# Patient Record
Sex: MALE | Race: WHITE | NOT HISPANIC OR LATINO
[De-identification: names, ages, dates, MRNs, and addresses within clinical notes are randomized per-mention and may not be internally consistent; named-entity substitution may affect disease eponyms.]

---

## 2017-01-26 PROBLEM — Z00.00 ENCOUNTER FOR PREVENTIVE HEALTH EXAMINATION: Status: ACTIVE | Noted: 2017-01-26

## 2017-02-02 ENCOUNTER — APPOINTMENT (OUTPATIENT)
Dept: CARDIOLOGY | Facility: CLINIC | Age: 67
End: 2017-02-02

## 2017-04-04 ENCOUNTER — APPOINTMENT (OUTPATIENT)
Dept: CARDIOLOGY | Facility: CLINIC | Age: 67
End: 2017-04-04

## 2017-04-11 ENCOUNTER — APPOINTMENT (OUTPATIENT)
Dept: CARDIOLOGY | Facility: CLINIC | Age: 67
End: 2017-04-11

## 2017-04-13 ENCOUNTER — APPOINTMENT (OUTPATIENT)
Dept: CARDIOLOGY | Facility: CLINIC | Age: 67
End: 2017-04-13

## 2017-10-02 ENCOUNTER — TRANSCRIPTION ENCOUNTER (OUTPATIENT)
Age: 67
End: 2017-10-02

## 2018-04-03 ENCOUNTER — RECORD ABSTRACTING (OUTPATIENT)
Age: 68
End: 2018-04-03

## 2018-04-04 ENCOUNTER — APPOINTMENT (OUTPATIENT)
Dept: CARDIOLOGY | Facility: CLINIC | Age: 68
End: 2018-04-04
Payer: MEDICARE

## 2018-04-04 VITALS
SYSTOLIC BLOOD PRESSURE: 118 MMHG | WEIGHT: 222 LBS | HEIGHT: 72 IN | DIASTOLIC BLOOD PRESSURE: 72 MMHG | HEART RATE: 56 BPM | BODY MASS INDEX: 30.07 KG/M2

## 2018-04-04 PROCEDURE — 99214 OFFICE O/P EST MOD 30 MIN: CPT

## 2018-06-05 ENCOUNTER — APPOINTMENT (OUTPATIENT)
Dept: CARDIOLOGY | Facility: CLINIC | Age: 68
End: 2018-06-05
Payer: MEDICARE

## 2018-06-05 PROCEDURE — 93306 TTE W/DOPPLER COMPLETE: CPT

## 2018-06-07 ENCOUNTER — RESULT REVIEW (OUTPATIENT)
Age: 68
End: 2018-06-07

## 2018-06-07 ENCOUNTER — APPOINTMENT (OUTPATIENT)
Dept: CARDIOLOGY | Facility: CLINIC | Age: 68
End: 2018-06-07

## 2018-06-20 ENCOUNTER — APPOINTMENT (OUTPATIENT)
Dept: CARDIOLOGY | Facility: CLINIC | Age: 68
End: 2018-06-20
Payer: MEDICARE

## 2018-06-20 PROCEDURE — 78472 GATED HEART PLANAR SINGLE: CPT

## 2018-06-20 PROCEDURE — A9560: CPT

## 2018-08-01 ENCOUNTER — APPOINTMENT (OUTPATIENT)
Dept: CARDIOLOGY | Facility: CLINIC | Age: 68
End: 2018-08-01
Payer: MEDICARE

## 2018-08-01 VITALS
BODY MASS INDEX: 30.34 KG/M2 | HEART RATE: 56 BPM | WEIGHT: 224 LBS | SYSTOLIC BLOOD PRESSURE: 138 MMHG | HEIGHT: 72 IN | DIASTOLIC BLOOD PRESSURE: 70 MMHG

## 2018-08-01 PROCEDURE — 99214 OFFICE O/P EST MOD 30 MIN: CPT

## 2018-11-07 ENCOUNTER — APPOINTMENT (OUTPATIENT)
Dept: CARDIOLOGY | Facility: CLINIC | Age: 68
End: 2018-11-07
Payer: MEDICARE

## 2018-11-07 ENCOUNTER — NON-APPOINTMENT (OUTPATIENT)
Age: 68
End: 2018-11-07

## 2018-11-07 VITALS
BODY MASS INDEX: 30.48 KG/M2 | HEART RATE: 64 BPM | SYSTOLIC BLOOD PRESSURE: 138 MMHG | DIASTOLIC BLOOD PRESSURE: 78 MMHG | HEIGHT: 72 IN | OXYGEN SATURATION: 98 % | WEIGHT: 225 LBS

## 2018-11-07 PROCEDURE — 99214 OFFICE O/P EST MOD 30 MIN: CPT

## 2018-11-07 PROCEDURE — 93000 ELECTROCARDIOGRAM COMPLETE: CPT

## 2018-11-07 NOTE — DISCUSSION/SUMMARY
[FreeTextEntry1] : #1 Labs from August was reviewed\par #2 MUGA study was reviewed. Low normal to slightly reduced LV systolic function. Clinically stable without signs of congestive heart failure. Chronic bundle-branch block.\par Nonobstructive coronary disease in the past. No signs of unstable CAD.\par  Cozaar  to 50 mg. \par Considering his tolerating losartan 50 mg we will increase metoprolol 25 mg to 50 mg long acting. He'll have a repeat LV ejection fraction in 3 months. If remains less than 50% in presence of left bundle branch block. I would like him to be seen by electrophysiologist to discuss indication for biventricular pacing.\par Low salt diet. low  alcohol intake.\par Regular exercise.\par weight reduction.\par If change in clinical status or worsening of his systolic function in the future. We can consider further evaluation, which includes a repeat coronary angiography and biventricular device therapy\par #3 hyperlipidemia. Continue statin therapy. Low saturated fat, carbohydrate intake. Regular exercise program. Weight reduction. Follow labs.\par #4 essential hypertension. Without any signs of congestive heart failure or renal insufficiency. No salt diet recommended. Regular exercise and weight reduction.\par #5 preventive care with your office.\par I have reviewed above at length. I answered all the questions. Patient verbalized understanding\par Thank you very much for allowing me to participate in your patient's care. Please feel free to call me for any questions.\par \par Followup in 3 months.

## 2018-11-07 NOTE — REASON FOR VISIT
[Follow-Up - Clinic] : a clinic follow-up of [Abnormal ECG] : an abnormal ECG [Cardiomyopathy] : cardiomyopathy [Hyperlipidemia] : hyperlipidemia [Hypertension] : hypertension [Medication Management] : Medication management [FreeTextEntry1] : 68-year-old comes in for a followup consultation \par No new changes in his symptoms overall stable without any significant chest pain.\par No palpitation, dizziness, or syncopal episode.\par Exertional dyspnea. He is stable. Mild severity. Intermittent in nature. Modified with exertion. none at rest. Nonprogressive.\par Stable. Diet.\par No recent changes in medications.\par No recent hospital admission. [Spouse] : spouse

## 2018-11-07 NOTE — ASSESSMENT
[FreeTextEntry1] : past tests for reference\par Echocardiogram which was done in January 9, 2017 at [facility] showed an ejection fraction 47%. Mildly reduced global LV dysfunction. Aortic root of 3.7 cm. Ascending aorta 3.48 cm. Septal motion abnormality related to conduction system abnormality. aortic valve sclerosis, trace aortic insufficnecy\par \par Pharmacological stress myocardial perfusion scan, 4/11/ 2017. LV ejection fraction 54% with paradoxical septal motion. Small area of mild defect in septal wall, which was fixed via probably related to left bundle-branch block. Overall, no significant worsening compared to last perfusion scan from 2007. Coronary angiogram at that time had shown no significant coronary artery disease\par \par Reviewed on August 1, 2018.\par MUGA study June 20, 2018  and ejection fraction 49%. Septal hypokinesis.

## 2018-11-07 NOTE — HISTORY OF PRESENT ILLNESS
[FreeTextEntry1] : History of essential hypertension. Much better controlled. The only on metoprolol. No history of CHF. No history of CKD. His nonsmoker.\par Hyperlipidemia. Not on any statin therapy. Diet controlled.\par Left bundle branch block. Chronic. Prior cardiac evaluation had shown an ejection fraction 55% in 2013. subsequently noted between 47-54% \par coronary angiogram 2008: mild irregularity\par mild- moderate aortic and mitral regurgitation. \par Mild carotid atherosclerosis \par

## 2018-11-07 NOTE — PHYSICAL EXAM
[General Appearance - Well Developed] : well developed [Normal Appearance] : normal appearance [Well Groomed] : well groomed [General Appearance - Well Nourished] : well nourished [No Deformities] : no deformities [General Appearance - In No Acute Distress] : no acute distress [Normal Conjunctiva] : the conjunctiva exhibited no abnormalities [Eyelids - No Xanthelasma] : the eyelids demonstrated no xanthelasmas [No Oral Pallor] : no oral pallor [Normal Jugular Venous A Waves Present] : normal jugular venous A waves present [Normal Jugular Venous V Waves Present] : normal jugular venous V waves present [No Jugular Venous Gonzalez A Waves] : no jugular venous gonzalez A waves [] : no respiratory distress [Respiration, Rhythm And Depth] : normal respiratory rhythm and effort [Exaggerated Use Of Accessory Muscles For Inspiration] : no accessory muscle use [Auscultation Breath Sounds / Voice Sounds] : lungs were clear to auscultation bilaterally [Heart Rate And Rhythm] : heart rate and rhythm were normal [Heart Sounds] : normal S1 and S2 [Arterial Pulses Normal] : the arterial pulses were normal [Edema] : no peripheral edema present [Veins - Varicosity Changes] : no varicosital changes were noted in the lower extremities [Abnormal Walk] : normal gait [Nail Clubbing] : no clubbing of the fingernails [Cyanosis, Localized] : no localized cyanosis [Skin Color & Pigmentation] : normal skin color and pigmentation [Oriented To Time, Place, And Person] : oriented to person, place, and time [Affect] : the affect was normal [Mood] : the mood was normal [No Anxiety] : not feeling anxious

## 2019-02-11 ENCOUNTER — APPOINTMENT (OUTPATIENT)
Dept: CARDIOLOGY | Facility: CLINIC | Age: 69
End: 2019-02-11
Payer: MEDICARE

## 2019-02-11 PROCEDURE — 93308 TTE F-UP OR LMTD: CPT

## 2019-02-18 ENCOUNTER — RECORD ABSTRACTING (OUTPATIENT)
Age: 69
End: 2019-02-18

## 2019-02-19 ENCOUNTER — APPOINTMENT (OUTPATIENT)
Dept: CARDIOLOGY | Facility: CLINIC | Age: 69
End: 2019-02-19
Payer: MEDICARE

## 2019-02-19 VITALS
OXYGEN SATURATION: 100 % | SYSTOLIC BLOOD PRESSURE: 130 MMHG | DIASTOLIC BLOOD PRESSURE: 72 MMHG | HEIGHT: 72 IN | WEIGHT: 226 LBS | HEART RATE: 54 BPM | BODY MASS INDEX: 30.61 KG/M2

## 2019-02-19 DIAGNOSIS — Z87.898 PERSONAL HISTORY OF OTHER SPECIFIED CONDITIONS: ICD-10-CM

## 2019-02-19 PROCEDURE — 99214 OFFICE O/P EST MOD 30 MIN: CPT

## 2019-02-19 RX ORDER — CEPHALEXIN 500 MG/1
500 CAPSULE ORAL
Qty: 35 | Refills: 0 | Status: DISCONTINUED | COMMUNITY
Start: 2018-07-10 | End: 2019-02-19

## 2019-02-19 RX ORDER — FLUOCINONIDE 0.5 MG/ML
0.05 SOLUTION TOPICAL
Qty: 60 | Refills: 0 | Status: DISCONTINUED | COMMUNITY
Start: 2018-06-12 | End: 2019-02-19

## 2019-02-19 NOTE — ASSESSMENT
[FreeTextEntry1] : past tests for reference\par Echocardiogram which was done in January 9, 2017 at [facility] showed an ejection fraction 47%. Mildly reduced global LV dysfunction. Aortic root of 3.7 cm. Ascending aorta 3.48 cm. Septal motion abnormality related to conduction system abnormality. aortic valve sclerosis, trace aortic insufficnecy\par \par Pharmacological stress myocardial perfusion scan, 4/11/ 2017. LV ejection fraction 54% with paradoxical septal motion. Small area of mild defect in septal wall, which was fixed via probably related to left bundle-branch block. Overall, no significant worsening compared to last perfusion scan from 2007. Coronary angiogram at that time had shown no significant coronary artery disease\par \par Reviewed on August 1, 2018.\par MUGA study June 20, 2018  and ejection fraction 49%. Septal hypokinesis.\par \par Reviewed on February 19, 2019\par Echocardiogram February 11, 2019 LV ejection fraction around 45-50%  LVIDd 5.1

## 2019-02-19 NOTE — DISCUSSION/SUMMARY
[FreeTextEntry1] : #1Echocardiogram reviewed.  stable. Findings, discussed . we will continue to follow. Any change in function status, which is class I or change in LV dimensions or an ejection fraction in future. We can discuss further whether he would benefit from biventricular device in presence of baseline left bundle branch block\par #2 MUGA study was reviewed. Low normal to slightly reduced LV systolic function. Clinically stable without signs of congestive heart failure. Chronic bundle-branch block.\par Nonobstructive coronary disease in the past. No signs of unstable CAD.\par  Continue present medications\par Low salt diet. low  alcohol intake.\par Regular exercise.\par weight reduction.\par If change in clinical status or worsening of his systolic function in the future. We can consider further evaluation, which includes a repeat coronary angiography and biventricular device therapy\par #3 hyperlipidemia. Continue statin therapy. Low saturated fat, carbohydrate intake. Regular exercise program. Weight reduction. Follow labs.\par #4 essential hypertension. Without any signs of congestive heart failure or renal insufficiency. No salt diet recommended. Regular exercise and weight reduction.\par #5 preventive care with your office.\par I have reviewed above at length. I answered all the questions. Patient verbalized understanding\par Thank you very much for allowing me to participate in your patient's care. Please feel free to call me for any questions.\par \par Followup in 6 months.

## 2019-02-19 NOTE — REASON FOR VISIT
7 [Follow-Up - Clinic] : a clinic follow-up of [Abnormal ECG] : an abnormal ECG [Cardiomyopathy] : cardiomyopathy [Hyperlipidemia] : hyperlipidemia [Hypertension] : hypertension [Medication Management] : Medication management [FreeTextEntry1] : 69-year-old comes in for followup consultation review his echocardiogram\par No new changes in his symptoms overall stable without any significant chest pain.\par No palpitation, dizziness, or syncopal episode.\par Exertional dyspnea. He is stable. Mild severity. Intermittent in nature. Modified with exertion. none at rest. Nonprogressive.\par Stable. Diet.\par No recent changes in medications.\par No recent hospital admission. [Spouse] : spouse

## 2019-04-03 ENCOUNTER — APPOINTMENT (OUTPATIENT)
Dept: CARDIOLOGY | Facility: CLINIC | Age: 69
End: 2019-04-03

## 2019-04-11 ENCOUNTER — TRANSCRIPTION ENCOUNTER (OUTPATIENT)
Age: 69
End: 2019-04-11

## 2019-09-04 ENCOUNTER — APPOINTMENT (OUTPATIENT)
Dept: CARDIOLOGY | Facility: CLINIC | Age: 69
End: 2019-09-04
Payer: MEDICARE

## 2019-09-04 VITALS
SYSTOLIC BLOOD PRESSURE: 124 MMHG | HEART RATE: 59 BPM | DIASTOLIC BLOOD PRESSURE: 68 MMHG | WEIGHT: 220 LBS | BODY MASS INDEX: 29.8 KG/M2 | HEIGHT: 72 IN | OXYGEN SATURATION: 96 %

## 2019-09-04 DIAGNOSIS — I49.9 CARDIAC ARRHYTHMIA, UNSPECIFIED: ICD-10-CM

## 2019-09-04 PROCEDURE — 99214 OFFICE O/P EST MOD 30 MIN: CPT

## 2019-09-04 RX ORDER — ROSUVASTATIN CALCIUM 20 MG/1
20 TABLET, FILM COATED ORAL DAILY
Refills: 0 | Status: DISCONTINUED | COMMUNITY
End: 2019-09-04

## 2019-09-04 RX ORDER — BETAMETHASONE DIPROPIONATE 0.5 MG/G
0.05 OINTMENT TOPICAL
Qty: 45 | Refills: 0 | Status: DISCONTINUED | COMMUNITY
Start: 2018-05-15 | End: 2019-09-04

## 2019-09-04 RX ORDER — LOSARTAN POTASSIUM 50 MG/1
50 TABLET, FILM COATED ORAL DAILY
Qty: 90 | Refills: 1 | Status: DISCONTINUED | COMMUNITY
End: 2019-09-04

## 2019-09-04 RX ORDER — TRIAMCINOLONE ACETONIDE 1 MG/G
0.1 CREAM TOPICAL
Qty: 454 | Refills: 0 | Status: DISCONTINUED | COMMUNITY
Start: 2018-06-12 | End: 2019-09-04

## 2019-09-04 NOTE — ASSESSMENT
[FreeTextEntry1] : past tests for reference\par Echocardiogram which was done in January 9, 2017 at [facility] showed an ejection fraction 47%. Mildly reduced global LV dysfunction. Aortic root of 3.7 cm. Ascending aorta 3.48 cm. Septal motion abnormality related to conduction system abnormality. aortic valve sclerosis, trace aortic insufficnecy\par \par Pharmacological stress myocardial perfusion scan, 4/11/ 2017. LV ejection fraction 54% with paradoxical septal motion. Small area of mild defect in septal wall, which was fixed via probably related to left bundle-branch block. Overall, no significant worsening compared to last perfusion scan from 2007. Coronary angiogram at that time had shown no significant coronary artery disease\par \par Reviewed on August 1, 2018.\par MUGA study June 20, 2018  and ejection fraction 49%. Septal hypokinesis.\par \par Reviewed on February 19, 2019\par Echocardiogram February 11, 2019 LV ejection fraction around 45-50%  LVIDd 5.1\par \par Reviewed on September 4, 2019\par Labs from August 17, 2019 was reviewed. Sodium was 140, potassium 4.4, creatinine 1.25 ., normal.

## 2019-09-04 NOTE — PHYSICAL EXAM
[General Appearance - Well Developed] : well developed [Normal Appearance] : normal appearance [Well Groomed] : well groomed [No Deformities] : no deformities [General Appearance - Well Nourished] : well nourished [General Appearance - In No Acute Distress] : no acute distress [Normal Conjunctiva] : the conjunctiva exhibited no abnormalities [Eyelids - No Xanthelasma] : the eyelids demonstrated no xanthelasmas [No Oral Pallor] : no oral pallor [Normal Jugular Venous A Waves Present] : normal jugular venous A waves present [Normal Jugular Venous V Waves Present] : normal jugular venous V waves present [No Jugular Venous Gonzalez A Waves] : no jugular venous gonzalez A waves [] : no respiratory distress [Respiration, Rhythm And Depth] : normal respiratory rhythm and effort [Exaggerated Use Of Accessory Muscles For Inspiration] : no accessory muscle use [Auscultation Breath Sounds / Voice Sounds] : lungs were clear to auscultation bilaterally [Heart Rate And Rhythm] : heart rate and rhythm were normal [Heart Sounds] : normal S1 and S2 [Arterial Pulses Normal] : the arterial pulses were normal [Edema] : no peripheral edema present [Veins - Varicosity Changes] : no varicosital changes were noted in the lower extremities [Abnormal Walk] : normal gait [Nail Clubbing] : no clubbing of the fingernails [Cyanosis, Localized] : no localized cyanosis [Skin Color & Pigmentation] : normal skin color and pigmentation [Oriented To Time, Place, And Person] : oriented to person, place, and time [Affect] : the affect was normal [Mood] : the mood was normal [No Anxiety] : not feeling anxious

## 2019-09-04 NOTE — ASSESSMENT
[FreeTextEntry1] : past tests for reference\par Echocardiogram which was done in January 9, 2017 at [facility] showed an ejection fraction 47%. Mildly reduced global LV dysfunction. Aortic root of 3.7 cm. Ascending aorta 3.48 cm. Septal motion abnormality related to conduction system abnormality. aortic valve sclerosis, trace aortic insufficnecy\par \par Pharmacological stress myocardial perfusion scan, 4/11/ 2017. LV ejection fraction 54% with paradoxical septal motion. Small area of mild defect in septal wall, which was fixed via probably related to left bundle-branch block. Overall, no significant worsening compared to last perfusion scan from 2007. Coronary angiogram at that time had shown no significant coronary artery disease\par \par Reviewed on August 1, 2018.\par MUGA study June 20, 2018  and ejection fraction 49%. Septal hypokinesis.\par \par Reviewed on February 19, 2019\par Echocardiogram February 11, 2019 LV ejection fraction around 45-50%  LVIDd 5.1\par \par Reviewed on September 4, 2019\par Labs from August 17, 2019 was reviewed. Sodium was 140, potassium 4.4, creatinine 1.25 ., normal.

## 2019-09-04 NOTE — REASON FOR VISIT
[Follow-Up - Clinic] : a clinic follow-up of [Abnormal ECG] : an abnormal ECG [Cardiomyopathy] : cardiomyopathy [Hyperlipidemia] : hyperlipidemia [Hypertension] : hypertension [Medication Management] : Medication management [FreeTextEntry1] : 69-year-old is seen in followup consultation. Since last seen he stopped taking his losartan because of skin rash.\par He has no significant new complaint of chest pain. No significant shortness of breath. No significant PND, orthopnea, pedal edema.\par He is no significant visual disturbances or focal weakness,\par He has been very active.\par He has lost weight.\par He has no claudication pain.\par He has no recent hospitalization. [Spouse] : spouse

## 2019-09-04 NOTE — DISCUSSION/SUMMARY
[FreeTextEntry1] : #1Labs were reviewed. Elevated creatinine. Increase fluid intake recommended. Followup labs again in about 2-3 months.\par #2 History of nonischemic myopathy, and presence of left bundle branch block. Last MUGA showed ejection fraction 49%. He has stopped taking those are done because of a rash. His blood pressure and heart rate is stable. Clinically, no signs of any significant congestive heart failure. Would recommend him to have repeat MUGA again. If LV ejection fraction is decreasing and presence of left bundle branch block. He would benefit from hydralazine/isosorbide mononitrate and biventricular pacing.\par He will continue with management as discussed\par Low salt diet. low  alcohol intake.\par Regular exercise.\par weight reduction.\par If change in clinical status or worsening of his systolic function in the future. We can consider further evaluation, which includes a repeat coronary angiography and biventricular device therapy\par #3 hyperlipidemia. Continue statin therapy. Low saturated fat, carbohydrate intake. Regular exercise program. Weight reduction. Follow labs.\par #4 essential hypertension. Without any signs of congestive heart failure or renal insufficiency. No salt diet recommended. Regular exercise and weight reduction.\par #5 His wife states he has possible apnea at night time. Considering his cardiomyopathy. Essential hypertension. And weakness apneic episode. He didn't have sleep studies done.\par preventive care with your office.\par \par Counseling regarding low saturated fat, salt and carbohydrate intake was reviewed. Active lifestyle and regular. Exercise along with weight management is advised.\par \par I have reviewed above at length. I answered all the questions. Patient verbalized understanding\par Thank you very much for allowing me to participate in your patient's care. Please feel free to call me for any questions.\par \par

## 2019-09-11 ENCOUNTER — APPOINTMENT (OUTPATIENT)
Dept: CARDIOLOGY | Facility: CLINIC | Age: 69
End: 2019-09-11
Payer: MEDICARE

## 2019-09-11 PROCEDURE — A9560: CPT

## 2019-09-11 PROCEDURE — 78472 GATED HEART PLANAR SINGLE: CPT

## 2019-09-16 ENCOUNTER — APPOINTMENT (OUTPATIENT)
Dept: CARDIOLOGY | Facility: CLINIC | Age: 69
End: 2019-09-16
Payer: MEDICARE

## 2019-09-16 VITALS
DIASTOLIC BLOOD PRESSURE: 68 MMHG | HEART RATE: 56 BPM | BODY MASS INDEX: 29.66 KG/M2 | HEIGHT: 72 IN | WEIGHT: 219 LBS | OXYGEN SATURATION: 97 % | SYSTOLIC BLOOD PRESSURE: 124 MMHG

## 2019-09-16 PROCEDURE — 99214 OFFICE O/P EST MOD 30 MIN: CPT

## 2019-09-16 NOTE — HISTORY OF PRESENT ILLNESS
[FreeTextEntry1] : KENROY TRISTAN  is a 69 year M  who presents today Sep 16, 2019 in clinical follow-up. Last seen on 9/4/19 with Dr. Galvez. At that time MUGA scan was recommended. It has been performed and he presents today to review the results. Since last seen no recent illness of hospital stay. Off Losartan due to rash. \par \par Today he denies chest pain, pressure, unusual shortness of breath, lightheadedness, dizziness, near syncope or syncope. \par \par History of essential hypertension. Much better controlled. On metoprolol. No history of CHF. No history of CKD. His nonsmoker.\par Hyperlipidemia. Not on any statin therapy. Diet controlled.\par Left bundle branch block. Chronic. Prior cardiac evaluation had shown an ejection fraction 55% in 2013. subsequently noted between 47-54% \par coronary angiogram 2008: mild irregularity\par mild- moderate aortic and mitral regurgitation. \par Mild carotid atherosclerosis \par

## 2019-09-16 NOTE — ASSESSMENT
[FreeTextEntry1] : past tests for reference\par Echocardiogram which was done in January 9, 2017 at [facility] showed an ejection fraction 47%. Mildly reduced global LV dysfunction. Aortic root of 3.7 cm. Ascending aorta 3.48 cm. Septal motion abnormality related to conduction system abnormality. aortic valve sclerosis, trace aortic insufficnecy\par \par Pharmacological stress myocardial perfusion scan, 4/11/ 2017. LV ejection fraction 54% with paradoxical septal motion. Small area of mild defect in septal wall, which was fixed via probably related to left bundle-branch block. Overall, no significant worsening compared to last perfusion scan from 2007. Coronary angiogram at that time had shown no significant coronary artery disease\par \par Reviewed on August 1, 2018.\par MUGA study June 20, 2018  and ejection fraction 49%. Septal hypokinesis.\par \par Reviewed on February 19, 2019\par Echocardiogram February 11, 2019 LV ejection fraction around 45-50%  LVIDd 5.1\par \par Reviewed on September 4, 2019\par Labs from August 17, 2019 was reviewed. Sodium was 140, potassium 4.4, creatinine 1.25 ., normal.\par \par MUGA 9/11/19 EF 50%

## 2019-09-16 NOTE — DISCUSSION/SUMMARY
[FreeTextEntry1] : KENROY TRISTAN  is a 69 year M  who presents today Sep 16, 2019 with the above history and the following active issues. \par \par History of nonischemic cardiomyopathy, and presence of left bundle branch block. MUGA scan demonstrates EF 50%. He has stopped taking Losartan because of a rash. His blood pressure and heart rate is stable. Clinically, no signs of any significant congestive heart failure. Recommend trial of Lisinopril 10mg.\par He will continue with management as discussed\par Low salt diet. low  alcohol intake.\par Regular exercise.\par weight reduction.\par If change in clinical status or worsening of his systolic function in the future. We can consider further evaluation, which includes a repeat coronary angiography and biventricular device therapy\par \par Hyperlipidemia. Continue statin therapy. Low saturated fat, carbohydrate intake. Regular exercise program. Weight reduction. Follow labs.\par \par Essential hypertension. Without any signs of congestive heart failure or renal insufficiency. Low salt diet recommended. Regular exercise and weight reduction.\par \par Red flag symptoms which would warrant sooner emergent evaluation reviewed with the patient. \par Questions and concerns were addressed and answered. \par \par Sincerely,\par \par Bhavani Meyer PA-C\par Patients history, testing and plan reviewed with supervising MD: Dr. Jim Galvez

## 2019-12-30 ENCOUNTER — APPOINTMENT (OUTPATIENT)
Dept: CARDIOLOGY | Facility: CLINIC | Age: 69
End: 2019-12-30
Payer: MEDICARE

## 2019-12-30 VITALS
BODY MASS INDEX: 29.8 KG/M2 | HEART RATE: 58 BPM | OXYGEN SATURATION: 100 % | DIASTOLIC BLOOD PRESSURE: 70 MMHG | SYSTOLIC BLOOD PRESSURE: 110 MMHG | WEIGHT: 220 LBS | HEIGHT: 72 IN

## 2019-12-30 PROCEDURE — 99214 OFFICE O/P EST MOD 30 MIN: CPT

## 2019-12-30 NOTE — ASSESSMENT
[FreeTextEntry1] : past tests for reference\par Echocardiogram which was done in January 9, 2017 at [facility] showed an ejection fraction 47%. Mildly reduced global LV dysfunction. Aortic root of 3.7 cm. Ascending aorta 3.48 cm. Septal motion abnormality related to conduction system abnormality. aortic valve sclerosis, trace aortic insufficnecy\par \par Pharmacological stress myocardial perfusion scan, 4/11/ 2017. LV ejection fraction 54% with paradoxical septal motion. Small area of mild defect in septal wall, which was fixed via probably related to left bundle-branch block. Overall, no significant worsening compared to last perfusion scan from 2007. Coronary angiogram at that time had shown no significant coronary artery disease\par \par Reviewed on August 1, 2018.\par MUGA study June 20, 2018  and ejection fraction 49%. Septal hypokinesis.\par \par Reviewed on February 19, 2019\par Echocardiogram February 11, 2019 LV ejection fraction around 45-50%  LVIDd 5.1\par \par Reviewed on September 4, 2019\par Labs from August 17, 2019 was reviewed. Sodium was 140, potassium 4.4, creatinine 1.25 ., normal.\par \par Review December 30, 2019\par Labs from November 20 6019 in December 20, 2019 was reviewed.\par MUGA 9/11/19 LVEF 50%

## 2019-12-30 NOTE — DISCUSSION/SUMMARY
[FreeTextEntry1] : #1Labs were reviewed. Slightly lower, GFR  stable. Follow with primary care physician. Continue ACE inhibitor. If needed consider nephrology evaluation.\par #2 History of nonischemic myopathy, and presence of left bundle branch block. Last MUGA showed ejection fraction 50%.  His blood pressure and heart rate is stable. Clinically, no signs of any significant congestive heart failure. Would recommend him to have repeat MUGA again.He will continue with lisinopril/metoprolol. If a reduction in LV systolic function is noted in future he may benefit from biventricular pacing in presence of chronic left bundle branch block\par Low salt diet. low  alcohol intake.\par Regular exercise.\par weight reduction.\par #3 hyperlipidemia. Continue statin therapy. Low saturated fat, carbohydrate intake. Regular exercise program. Weight reduction. Follow labs.\par #4 essential hypertension. Without any signs of congestive heart failure or renal insufficiency. No salt diet recommended. Regular exercise and weight reduction.\par #5 Sleep apnea. Continue use of CPAP.\par preventive care with your office.\par \par Counseling regarding low saturated fat, salt and carbohydrate intake was reviewed. Active lifestyle and regular. Exercise along with weight management is advised.\par \par All the above were at length reviewed. Answered all the questions. Thank you very much for this kind referral. Please do not hesitate to give me a call for any question.\par Part of this transcription was done with voice recognition software and phonetically similar errors are common. I apologize for that. Please do not hesitate to call for any questions due to above.\par \par \par

## 2019-12-30 NOTE — HISTORY OF PRESENT ILLNESS
[FreeTextEntry1] : History of essential hypertension. Much better controlled. The only on metoprolol. No history of CHF. No history of CKD. His nonsmoker.\par Hyperlipidemia. Not on any statin therapy. Diet controlled.\par Left bundle branch block. Chronic. \par coronary angiogram 2008: mild irregularity\par Nonischemic cardiomyopathy. Mildly reduced systolic function. September 2019 MUGA 50%. echo 2/19 45-50% LVIDd 5.1 cm\par mild- moderate aortic and mitral regurgitation. \par Mild carotid atherosclerosis \par

## 2019-12-30 NOTE — PHYSICAL EXAM
[General Appearance - Well Developed] : well developed [Well Groomed] : well groomed [Normal Appearance] : normal appearance [General Appearance - Well Nourished] : well nourished [No Deformities] : no deformities [Normal Conjunctiva] : the conjunctiva exhibited no abnormalities [General Appearance - In No Acute Distress] : no acute distress [Eyelids - No Xanthelasma] : the eyelids demonstrated no xanthelasmas [No Oral Pallor] : no oral pallor [Normal Jugular Venous V Waves Present] : normal jugular venous V waves present [Normal Jugular Venous A Waves Present] : normal jugular venous A waves present [No Jugular Venous Gonzalez A Waves] : no jugular venous gonzalez A waves [Respiration, Rhythm And Depth] : normal respiratory rhythm and effort [] : no respiratory distress [Exaggerated Use Of Accessory Muscles For Inspiration] : no accessory muscle use [Auscultation Breath Sounds / Voice Sounds] : lungs were clear to auscultation bilaterally [Heart Sounds] : normal S1 and S2 [Heart Rate And Rhythm] : heart rate and rhythm were normal [Arterial Pulses Normal] : the arterial pulses were normal [Veins - Varicosity Changes] : no varicosital changes were noted in the lower extremities [Edema] : no peripheral edema present [Abdomen Soft] : soft [Abnormal Walk] : normal gait [FreeTextEntry1] : tommie 1-2/6 at the base, no gallop/rub/click [Nail Clubbing] : no clubbing of the fingernails [Cyanosis, Localized] : no localized cyanosis [Skin Color & Pigmentation] : normal skin color and pigmentation [Mood] : the mood was normal [Oriented To Time, Place, And Person] : oriented to person, place, and time [Affect] : the affect was normal [No Anxiety] : not feeling anxious

## 2019-12-30 NOTE — REASON FOR VISIT
[Follow-Up - Clinic] : a clinic follow-up of [Cardiomyopathy] : cardiomyopathy [Hyperlipidemia] : hyperlipidemia [Hypertension] : hypertension [Medication Management] : Medication management [FreeTextEntry1] : 69-year-old gentleman comes in for follow up consultation. Since last seen it appears with the use of CPAP. He has been feeling well. More energy. Improve blood pressure control.\par He has no significant new complaint of chest pain. No significant shortness of breath. No significant PND, orthopnea, pedal edema.\par He is no significant visual disturbances or focal weakness,\par He has been very active.\par He has lost weight.\par He has no claudication pain.\par He has no recent hospitalization.

## 2020-06-09 ENCOUNTER — APPOINTMENT (OUTPATIENT)
Dept: CARDIOLOGY | Facility: CLINIC | Age: 70
End: 2020-06-09
Payer: MEDICARE

## 2020-06-09 VITALS
SYSTOLIC BLOOD PRESSURE: 122 MMHG | HEART RATE: 46 BPM | BODY MASS INDEX: 29.8 KG/M2 | OXYGEN SATURATION: 98 % | WEIGHT: 220 LBS | HEIGHT: 72 IN | TEMPERATURE: 97.5 F | DIASTOLIC BLOOD PRESSURE: 68 MMHG

## 2020-06-09 DIAGNOSIS — N40.0 BENIGN PROSTATIC HYPERPLASIA WITHOUT LOWER URINARY TRACT SYMPMS: ICD-10-CM

## 2020-06-09 PROCEDURE — 99214 OFFICE O/P EST MOD 30 MIN: CPT

## 2020-06-09 RX ORDER — TAMSULOSIN HYDROCHLORIDE 0.4 MG/1
0.4 CAPSULE ORAL
Qty: 90 | Refills: 0 | Status: ACTIVE | COMMUNITY
Start: 1900-01-01 | End: 1900-01-01

## 2020-06-09 NOTE — REASON FOR VISIT
[Cardiomyopathy] : cardiomyopathy [Follow-Up - Clinic] : a clinic follow-up of [Hyperlipidemia] : hyperlipidemia [Hypertension] : hypertension [Medication Management] : Medication management [FreeTextEntry1] : 69-year-old gentleman comes in for follow up consultation.\par Stable exertional dyspnea.\par He has no significant new complaint of chest pain. No significant shortness of breath. No significant PND, orthopnea, pedal edema.\par Has been using CPAP with good night sleep and improved blood pressure control\par He is no significant visual disturbances or focal weakness,\par He has been very active.\par He has lost weight.\par He has no claudication pain.\par He has no recent hospitalization.

## 2020-06-09 NOTE — ASSESSMENT
[FreeTextEntry1] : past tests for reference\par Echocardiogram which was done in January 9, 2017 at [facility] showed an ejection fraction 47%. Mildly reduced global LV dysfunction. Aortic root of 3.7 cm. Ascending aorta 3.48 cm. Septal motion abnormality related to conduction system abnormality. aortic valve sclerosis, trace aortic insufficnecy\par \par Pharmacological stress myocardial perfusion scan, 4/11/ 2017. LV ejection fraction 54% with paradoxical septal motion. Small area of mild defect in septal wall, which was fixed via probably related to left bundle-branch block. Overall, no significant worsening compared to last perfusion scan from 2007. Coronary angiogram at that time had shown no significant coronary artery disease\par \par Reviewed on August 1, 2018.\par MUGA study June 20, 2018  and ejection fraction 49%. Septal hypokinesis.\par \par Reviewed on February 19, 2019\par Echocardiogram February 11, 2019 LV ejection fraction around 45-50%  LVIDd 5.1\par \par Reviewed on September 4, 2019\par Labs from August 17, 2019 was reviewed. Sodium was 140, potassium 4.4, creatinine 1.25 ., normal.\par \par Review December 30, 2019\par Labs from November 20 6019 and December 20, 2019 was reviewed.\par MUGA 9/11/19 LVEF 50%\par \par

## 2020-06-09 NOTE — PHYSICAL EXAM
[Normal Appearance] : normal appearance [General Appearance - Well Developed] : well developed [Well Groomed] : well groomed [General Appearance - Well Nourished] : well nourished [General Appearance - In No Acute Distress] : no acute distress [No Deformities] : no deformities [Normal Conjunctiva] : the conjunctiva exhibited no abnormalities [Eyelids - No Xanthelasma] : the eyelids demonstrated no xanthelasmas [No Oral Pallor] : no oral pallor [Normal Jugular Venous A Waves Present] : normal jugular venous A waves present [Normal Jugular Venous V Waves Present] : normal jugular venous V waves present [No Jugular Venous Gonzalez A Waves] : no jugular venous gonzalez A waves [] : no respiratory distress [Respiration, Rhythm And Depth] : normal respiratory rhythm and effort [Exaggerated Use Of Accessory Muscles For Inspiration] : no accessory muscle use [Auscultation Breath Sounds / Voice Sounds] : lungs were clear to auscultation bilaterally [Heart Rate And Rhythm] : heart rate and rhythm were normal [Heart Sounds] : normal S1 and S2 [Arterial Pulses Normal] : the arterial pulses were normal [Edema] : no peripheral edema present [Veins - Varicosity Changes] : no varicosital changes were noted in the lower extremities [Abnormal Walk] : normal gait [Abdomen Soft] : soft [Nail Clubbing] : no clubbing of the fingernails [Cyanosis, Localized] : no localized cyanosis [Skin Color & Pigmentation] : normal skin color and pigmentation [Oriented To Time, Place, And Person] : oriented to person, place, and time [Affect] : the affect was normal [Mood] : the mood was normal [No Anxiety] : not feeling anxious [FreeTextEntry1] : tommie 1-2/6 at the base, no gallop/rub/click

## 2020-06-09 NOTE — DISCUSSION/SUMMARY
[FreeTextEntry1] : #1  Labs ordered.\par #2 History of nonischemic myopathy, and presence of left bundle branch block. Last MUGA showed ejection fraction 50%.  His blood pressure and heart rate is stable. Clinically, no signs of any significant congestive heart failure.  Would recommend him to have a contrast echocardiogram considering important for follow-up on LV ejection fraction dimension.  His transthoracic echocardiogram without contrast has not been of good technical study requiring further imaging in the past.  If there is worsening of LV ejection fraction dimension we will have to be more aggressive with his medical management.  We may also have to talk about biventricular pacing in presence of left bundle branch block.He will continue with lisinopril/metoprolol.\par Low salt diet. low  alcohol intake.\par Regular exercise.\par weight reduction.\par #3 hyperlipidemia. Continue statin therapy. Low saturated fat, carbohydrate intake. Regular exercise program. Weight reduction. Follow labs.\par #4 essential hypertension. Without any signs of congestive heart failure or renal insufficiency. No salt diet recommended. Regular exercise and weight reduction.\par #5 Sleep apnea. Continue use of CPAP.\par preventive care with your office.\par \par Counseling regarding low saturated fat, salt and carbohydrate intake was reviewed. Active lifestyle and regular. Exercise along with weight management is advised.\par \par All the above were at length reviewed. Answered all the questions. Thank you very much for this kind referral. Please do not hesitate to give me a call for any question.\par Part of this transcription was done with voice recognition software and phonetically similar errors are common. I apologize for that. Please do not hesitate to call for any questions due to above.\par \par \par

## 2020-06-09 NOTE — HISTORY OF PRESENT ILLNESS
[FreeTextEntry1] : History of \par essential hypertension. Much better controlled. The only on metoprolol. No history of CHF. No history of CKD. His nonsmoker.\par Hyperlipidemia. Not on any statin therapy. Diet controlled.\par Left bundle branch block. Chronic. \par coronary angiogram 2008: mild irregularity\par Nonischemic cardiomyopathy. Mildly reduced systolic function. September 2019 MUGA 50%. echo 2/19 45-50% LVIDd 5.1 cm\par mild- moderate aortic and mitral regurgitation. \par Mild carotid atherosclerosis \par

## 2020-11-11 ENCOUNTER — APPOINTMENT (OUTPATIENT)
Dept: CARDIOLOGY | Facility: CLINIC | Age: 70
End: 2020-11-11
Payer: MEDICARE

## 2020-11-11 ENCOUNTER — NON-APPOINTMENT (OUTPATIENT)
Age: 70
End: 2020-11-11

## 2020-11-11 VITALS
HEIGHT: 72 IN | SYSTOLIC BLOOD PRESSURE: 128 MMHG | DIASTOLIC BLOOD PRESSURE: 70 MMHG | WEIGHT: 223 LBS | OXYGEN SATURATION: 97 % | HEART RATE: 55 BPM | BODY MASS INDEX: 30.2 KG/M2

## 2020-11-11 DIAGNOSIS — R00.2 PALPITATIONS: ICD-10-CM

## 2020-11-11 PROCEDURE — 99215 OFFICE O/P EST HI 40 MIN: CPT

## 2020-11-11 PROCEDURE — 93000 ELECTROCARDIOGRAM COMPLETE: CPT

## 2020-11-11 PROCEDURE — 93306 TTE W/DOPPLER COMPLETE: CPT

## 2020-11-11 RX ORDER — TADALAFIL 5 MG/1
5 TABLET, FILM COATED ORAL
Qty: 6 | Refills: 0 | Status: DISCONTINUED | COMMUNITY
Start: 2018-04-30 | End: 2020-11-11

## 2020-11-11 NOTE — REASON FOR VISIT
Lab: [id number] Lab: [id number] [Follow-Up - Clinic] : a clinic follow-up of [Cardiomyopathy] : cardiomyopathy [Hyperlipidemia] : hyperlipidemia [Hypertension] : hypertension [Medication Management] : Medication management [FreeTextEntry1] : 70-year-old comes in for follow-up consultation\par One episode of palpitation while working in the yard.  Lasted for about 15 minutes.  Rapid heartbeats.  No associated dizziness chest pain shortness of breath diaphoresis.  Resolves on its own.  No recurrence.  Stable activity exercise without any limitations.  \par He has no significant new complaint of chest pain. No significant shortness of breath. No significant PND, orthopnea, pedal edema.\par Has been using CPAP with good night sleep and improved blood pressure control\par He is no significant visual disturbances or focal weakness,\par He has been very active.\par He has lost weight.\par He has no claudication pain.\par He has no recent hospitalization.

## 2020-11-11 NOTE — PHYSICAL EXAM
[General Appearance - Well Developed] : well developed [Normal Appearance] : normal appearance [Well Groomed] : well groomed [General Appearance - Well Nourished] : well nourished [No Deformities] : no deformities [General Appearance - In No Acute Distress] : no acute distress [] : no respiratory distress [Respiration, Rhythm And Depth] : normal respiratory rhythm and effort [Exaggerated Use Of Accessory Muscles For Inspiration] : no accessory muscle use [Auscultation Breath Sounds / Voice Sounds] : lungs were clear to auscultation bilaterally [Heart Rate And Rhythm] : heart rate and rhythm were normal [Heart Sounds] : normal S1 and S2 [Arterial Pulses Normal] : the arterial pulses were normal [Edema] : no peripheral edema present [Veins - Varicosity Changes] : no varicosital changes were noted in the lower extremities [Abdomen Soft] : soft [Abnormal Walk] : normal gait [Nail Clubbing] : no clubbing of the fingernails [Cyanosis, Localized] : no localized cyanosis [Skin Color & Pigmentation] : normal skin color and pigmentation [Oriented To Time, Place, And Person] : oriented to person, place, and time [Affect] : the affect was normal [Mood] : the mood was normal [No Anxiety] : not feeling anxious [FreeTextEntry1] : tommie musical 1-2/6 at the base, no gallop/rub/click

## 2020-11-11 NOTE — ASSESSMENT
[FreeTextEntry1] : past tests for reference\par Echocardiogram which was done in January 9, 2017 at [facility] showed an ejection fraction 47%. Mildly reduced global LV dysfunction. Aortic root of 3.7 cm. Ascending aorta 3.48 cm. Septal motion abnormality related to conduction system abnormality. aortic valve sclerosis, trace aortic insufficnecy\par \par Pharmacological stress myocardial perfusion scan, 4/11/ 2017. LV ejection fraction 54% with paradoxical septal motion. Small area of mild defect in septal wall, which was fixed via probably related to left bundle-branch block. Overall, no significant worsening compared to last perfusion scan from 2007. Coronary angiogram at that time had shown no significant coronary artery disease\par \par Reviewed on August 1, 2018.\par MUGA study June 20, 2018  and ejection fraction 49%. Septal hypokinesis.\par \par Reviewed on February 19, 2019\par Echocardiogram February 11, 2019 LV ejection fraction around 45-50%  LVIDd 5.1\par \par Reviewed on September 4, 2019\par Labs from August 17, 2019 was reviewed. Sodium was 140, potassium 4.4, creatinine 1.25 ., normal.\par \par Review December 30, 2019\par Labs from November 20 6019 and December 20, 2019 was reviewed.\par MUGA 9/11/19 LVEF 50%\par \par Reviewed on November 11, 2020.\par Labs from October 6, 2020 platelet count 144 otherwise stable CBC creatinine 1.41\par Prior test from June 16 platelet count 128 otherwise stable CBC CMP with creatinine 1.34 potassium 4.5 sodium 140 LFT normal HDL 34 LDL 82 total cholesterol 140\par EKG normal sinus rhythm left bundle branch block\par Echocardiogram as reported above

## 2020-11-11 NOTE — DISCUSSION/SUMMARY
[FreeTextEntry1] : #1  Labs were reviewed.  Mild thrombocytopenia.  Follow-up with primary care physician.  Worsening renal insufficiency with lower GFR.  Recommended follow-up with nephrology.  Adjustment of medication as per evaluation by nephrology.\par #2 History of nonischemic myopathy, and presence of left bundle branch block. Last MUGA showed ejection fraction 50%.  His blood pressure and heart rate is stable. Clinically, no signs of any significant congestive heart failure.  Stable LV ejection fraction noted on echocardiogram.  If there is worsening of LV ejection fraction dimension we will have to be more aggressive with his medical management.  We may also have to talk about biventricular pacing in presence of left bundle branch block.He will continue with lisinopril/metoprolol.\par Low salt diet. low  alcohol intake.\par Regular exercise.\par weight reduction.\par #3 hyperlipidemia. Continue statin therapy. Low saturated fat, carbohydrate intake. Regular exercise program. Weight reduction. Follow labs.\par #4 essential hypertension.  Hypertensive heart disease with septal hypertrophy.  Mild LVOT gradient asymptomatic.  Renal insufficiency.  Non-smoker.  Nephrology follow-up.  Continue blood pressure control less than 130/80.  Further recommendation based on nephrology follow-up.\par In presence of LVOT gradient increase fluid intake.  Continue to follow echocardiogram.  If worsening LVOT gradient we can consider other evaluation management which may include septal reduction procedure.  At present very mild asymptomatic.  With stable blood pressure and heart rate.\par #5 Sleep apnea. Continue use of CPAP.\par #6 intermittent palpitation one episode.  If recurrent he will benefit from long-term event monitoring and decide further therapy with that.  Recommended to contact us if he has recurrent events.\par preventive care with your office.\par \par Counseling regarding low saturated fat, salt and carbohydrate intake was reviewed. Active lifestyle and regular. Exercise along with weight management is advised.\par \par All the above were at length reviewed. Answered all the questions. Thank you very much for this kind referral. Please do not hesitate to give me a call for any question.\par Part of this transcription was done with voice recognition software and phonetically similar errors are common. I apologize for that. Please do not hesitate to call for any questions due to above.\par \par \par

## 2021-06-24 ENCOUNTER — NON-APPOINTMENT (OUTPATIENT)
Age: 71
End: 2021-06-24

## 2021-07-13 ENCOUNTER — APPOINTMENT (OUTPATIENT)
Dept: CARDIOLOGY | Facility: CLINIC | Age: 71
End: 2021-07-13
Payer: MEDICARE

## 2021-07-13 VITALS
HEIGHT: 72 IN | WEIGHT: 225 LBS | OXYGEN SATURATION: 97 % | SYSTOLIC BLOOD PRESSURE: 124 MMHG | HEART RATE: 51 BPM | DIASTOLIC BLOOD PRESSURE: 64 MMHG | BODY MASS INDEX: 30.48 KG/M2

## 2021-07-13 PROCEDURE — 99214 OFFICE O/P EST MOD 30 MIN: CPT

## 2021-07-13 RX ORDER — ATORVASTATIN CALCIUM 40 MG/1
40 TABLET, FILM COATED ORAL DAILY
Qty: 90 | Refills: 3 | Status: DISCONTINUED | COMMUNITY
End: 2021-07-13

## 2021-08-24 NOTE — DISCUSSION/SUMMARY
[FreeTextEntry1] : 71-year-old gentleman with above medical history active medical problems as noted below \par \par #1  Labs were reviewed.  Other information to be obtained.\par #2 History of nonischemic myopathy, and presence of left bundle branch block. Last MUGA showed ejection fraction 50%.  His blood pressure and heart rate is stable. Clinically, no signs of any significant congestive heart failure.  Stable LV ejection fraction noted on echocardiogram.  If there is worsening of LV ejection fraction dimension we will have to be more aggressive with his medical management.  We may also have to talk about biventricular pacing in presence of left bundle branch block.He will continue with lisinopril/metoprolol.\par Low salt diet. low  alcohol intake.\par Regular exercise.\par weight reduction.\par #3 hyperlipidemia. Continue statin therapy. Low saturated fat, carbohydrate intake. Regular exercise program. Weight reduction.  Obtain fasting lipid panel.\par #4 essential hypertension.  Hypertensive heart disease with septal hypertrophy.  Mild LVOT gradient asymptomatic.  Renal insufficiency.  Non-smoker.  Nephrology follow-up.  Continue blood pressure control less than 130/80.  Further recommendation based on nephrology follow-up.\par In presence of LVOT gradient increase fluid intake.  Continue to follow echocardiogram.  If worsening LVOT gradient we can consider other evaluation management which may include septal reduction procedure.  At present very mild asymptomatic.  With stable blood pressure and heart rate.\par #5 Sleep apnea. Continue use of CPAP.\par #6 intermittent palpitation.  No recent episode.  If recurrent he will benefit from long-term event monitoring and decide further therapy with that.  Recommended to contact us if he has recurrent events.\par preventive care with your office.\par \par Counseling regarding low saturated fat, salt and carbohydrate intake was reviewed. Active lifestyle and regular. Exercise along with weight management is advised.\par \par All the above were at length reviewed. Answered all the questions. Thank you very much for this kind referral. Please do not hesitate to give me a call for any question.\par Part of this transcription was done with voice recognition software and phonetically similar errors are common. I apologize for that. Please do not hesitate to call for any questions due to above.\par \par \par

## 2021-08-24 NOTE — ASSESSMENT
[FreeTextEntry1] : past tests for reference\par Echocardiogram which was done in January 9, 2017 at [facility] showed an ejection fraction 47%. Mildly reduced global LV dysfunction. Aortic root of 3.7 cm. Ascending aorta 3.48 cm. Septal motion abnormality related to conduction system abnormality. aortic valve sclerosis, trace aortic insufficnecy\par \par Pharmacological stress myocardial perfusion scan, 4/11/ 2017. LV ejection fraction 54% with paradoxical septal motion. Small area of mild defect in septal wall, which was fixed via probably related to left bundle-branch block. Overall, no significant worsening compared to last perfusion scan from 2007. Coronary angiogram at that time had shown no significant coronary artery disease\par \par Reviewed on August 1, 2018.\par MUGA study June 20, 2018  and ejection fraction 49%. Septal hypokinesis.\par \par Reviewed on February 19, 2019\par Echocardiogram February 11, 2019 LV ejection fraction around 45-50%  LVIDd 5.1\par \par Reviewed on September 4, 2019\par Labs from August 17, 2019 was reviewed. Sodium was 140, potassium 4.4, creatinine 1.25 ., normal.\par \par Review December 30, 2019\par Labs from November 20 6019 and December 20, 2019 was reviewed.\par MUGA 9/11/19 LVEF 50%\par \par Reviewed on November 11, 2020.\par Labs from October 6, 2020 platelet count 144 otherwise stable CBC creatinine 1.41\par Prior test from June 16 platelet count 128 otherwise stable CBC CMP with creatinine 1.34 potassium 4.5 sodium 140 LFT normal HDL 34 LDL 82 total cholesterol 140\par EKG normal sinus rhythm left bundle branch block\par Echocardiogram as reported above\par \par Reviewed on July 13, 2021.\par Most recent labs May 4, 2021 sodium 144 potassium 4.2 creatinine 1.09.

## 2021-08-24 NOTE — REASON FOR VISIT
[Symptom and Test Evaluation] : symptom and test evaluation [Structural Heart and Valve Disease] : structural heart and valve disease [Hyperlipidemia] : hyperlipidemia [Hypertension] : hypertension [FreeTextEntry3] : Dr. Weiss [FreeTextEntry1] : 71-year-old gentleman comes in with the spouse for follow-up consultation.  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  Is being evaluated with possible surgical intervention.\par He has no significant new complaint of chest pain. No significant shortness of breath. No significant PND, orthopnea, pedal edema.\par Has been using CPAP with good night sleep and improved blood pressure control\par He is no significant visual disturbances or focal weakness,\par He has been very active.\par He has lost weight.\par He has no claudication pain.\par He has no recent hospitalization.

## 2021-08-24 NOTE — ADDENDUM
[FreeTextEntry1] : I was asked to do addendum to my note from July 13, 2021 for endoscopy and colonoscopy preoperative cardiac assessment.\par No clinical change since last seen. Considering that\par \par At present, there are no active cardiac conditions.\par No recent unstable coronary syndrome, decompensated heart failure, severe valvular heart disease or significant dysrhythmias.\par The clinical benefit of the proposed procedure outweighs the associated cardiovascular risk.\par Risk not attenuated with further cardiovascular testing.\par Prior testing as outlined above.\par Optimized from a cardiovascular perspective.\par Control blood pressure, heart rate, pulse oximetry perioperatively.\par If required appropriate intravenous medication for the outpatient oral medication for blood pressure, heart rate control.\par DVT prophylaxis as per indication.

## 2021-08-24 NOTE — PHYSICAL EXAM
[Well Developed] : well developed [Well Nourished] : well nourished [No Acute Distress] : no acute distress [Obese] : obese [Normal Conjunctiva] : normal conjunctiva [Normal Venous Pressure] : normal venous pressure [No Carotid Bruit] : no carotid bruit [Normal S1, S2] : normal S1, S2 [No Rub] : no rub [No Gallop] : no gallop [Clear Lung Fields] : clear lung fields [Good Air Entry] : good air entry [No Respiratory Distress] : no respiratory distress  [Soft] : abdomen soft [Non Tender] : non-tender [No Masses/organomegaly] : no masses/organomegaly [Normal Bowel Sounds] : normal bowel sounds [Normal Gait] : normal gait [No Edema] : no edema [No Cyanosis] : no cyanosis [No Clubbing] : no clubbing [No Varicosities] : no varicosities [Normal Radial B/L] : normal radial B/L [Normal PT B/L] : normal PT B/L [Normal DP B/L] : normal DP B/L [No Rash] : no rash [No Skin Lesions] : no skin lesions [Moves all extremities] : moves all extremities [No Focal Deficits] : no focal deficits [Normal Speech] : normal speech [Alert and Oriented] : alert and oriented [Normal memory] : normal memory [de-identified] : Systolic murmur 1-2 over 6 at the base.

## 2021-08-31 ENCOUNTER — NON-APPOINTMENT (OUTPATIENT)
Age: 71
End: 2021-08-31

## 2021-09-06 ENCOUNTER — RESULT CHARGE (OUTPATIENT)
Age: 71
End: 2021-09-06

## 2021-09-07 ENCOUNTER — NON-APPOINTMENT (OUTPATIENT)
Age: 71
End: 2021-09-07

## 2021-09-07 ENCOUNTER — APPOINTMENT (OUTPATIENT)
Dept: CARDIOLOGY | Facility: CLINIC | Age: 71
End: 2021-09-07
Payer: MEDICARE

## 2021-09-07 VITALS
HEIGHT: 72 IN | TEMPERATURE: 97.5 F | DIASTOLIC BLOOD PRESSURE: 60 MMHG | WEIGHT: 220 LBS | OXYGEN SATURATION: 98 % | SYSTOLIC BLOOD PRESSURE: 104 MMHG | HEART RATE: 70 BPM | BODY MASS INDEX: 29.8 KG/M2

## 2021-09-07 PROCEDURE — 93000 ELECTROCARDIOGRAM COMPLETE: CPT

## 2021-09-07 PROCEDURE — 99213 OFFICE O/P EST LOW 20 MIN: CPT

## 2021-09-07 NOTE — REASON FOR VISIT
[Symptom and Test Evaluation] : symptom and test evaluation [Arrhythmia/ECG Abnorrmalities] : arrhythmia/ECG abnormalities [FreeTextEntry1] : 71-year-old gentleman comes in with the spouse for follow-up consultation.  To have an EKG after starting high dose of clarithromycin for H. 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  Is being evaluated with possible surgical intervention.  He had endoscopy.  Noticed to have H. pylori infection.  Gastroenterologist started him on clarithromycin for last 8 days.  Because of his underlying EKG changes he came in for EKGs\par He has no significant new complaint of chest pain. No significant shortness of breath. No significant PND, orthopnea, pedal edema.\par Has been using CPAP with good night sleep and improved blood pressure control\par He is no significant visual disturbances or focal weakness,\par He has been very active.\par He has lost weight.\par He has no claudication pain.\par He has no recent hospitalization.

## 2021-09-07 NOTE — CARDIOLOGY SUMMARY
[de-identified] : September 7, 2021.  Sinus bradycardia.  Left bundle branch block [___] : [unfilled] [LVEF ___%] : LVEF [unfilled]% [None] : no pulmonary hypertension [Enlarged] : enlarged LA size [Mild] : mild mitral regurgitation

## 2021-09-07 NOTE — PHYSICAL EXAM
[Well Developed] : well developed [Well Nourished] : well nourished [No Acute Distress] : no acute distress [Normal Conjunctiva] : normal conjunctiva [Normal Venous Pressure] : normal venous pressure [No Carotid Bruit] : no carotid bruit [Normal S1, S2] : normal S1, S2 [No Rub] : no rub [No Gallop] : no gallop [Clear Lung Fields] : clear lung fields [Good Air Entry] : good air entry [No Respiratory Distress] : no respiratory distress  [Soft] : abdomen soft [Non Tender] : non-tender [No Masses/organomegaly] : no masses/organomegaly [Normal Bowel Sounds] : normal bowel sounds [Normal Gait] : normal gait [No Edema] : no edema [No Cyanosis] : no cyanosis [No Clubbing] : no clubbing [No Rash] : no rash [No Skin Lesions] : no skin lesions [Moves all extremities] : moves all extremities [No Focal Deficits] : no focal deficits [Normal Speech] : normal speech [Alert and Oriented] : alert and oriented [Normal memory] : normal memory [de-identified] : Midsystolic murmur.

## 2021-09-07 NOTE — DISCUSSION/SUMMARY
[FreeTextEntry1] : 71-year-old gentleman with above medical history active medical problems as noted below \par \par #1  EKG showed no new changes.  Baseline sinus bradycardia/left bundle branch block.  He will be done with his clarithromycin in next 3 to 4 days.  If he has any significant palpitation, dizziness, weakness, near syncopal or syncopal event he will contact us right away and stop clarithromycin.  In that situation he will have to call also 911\par #2 History of nonischemic myopathy, and presence of left bundle branch block. Last MUGA showed ejection fraction 50%.  His blood pressure and heart rate is stable. Clinically, no signs of any significant congestive heart failure.  Stable LV ejection fraction noted on echocardiogram.  If there is worsening of LV ejection fraction dimension we will have to be more aggressive with his medical management.  We may also have to talk about biventricular pacing in presence of left bundle branch block.He will continue with lisinopril/metoprolol.\par Low salt diet. low  alcohol intake.\par Regular exercise.\par weight reduction.\par #3 hyperlipidemia. Continue statin therapy. Low saturated fat, carbohydrate intake. Regular exercise program. Weight reduction.  Obtain fasting lipid panel.\par #4 essential hypertension.  Hypertensive heart disease with septal hypertrophy.  Mild LVOT gradient asymptomatic.  Renal insufficiency.  Non-smoker.  Nephrology follow-up.  Continue blood pressure control less than 130/80.  Further recommendation based on nephrology follow-up.\par In presence of LVOT gradient increase fluid intake.  Continue to follow echocardiogram.  If worsening LVOT gradient we can consider other evaluation management which may include septal reduction procedure.  At present very mild asymptomatic.  With stable blood pressure and heart rate.\par #5 Sleep apnea. Continue use of CPAP.\par #6 intermittent palpitation.  No recent episode.  If recurrent he will benefit from long-term event monitoring and decide further therapy with that.  Recommended to contact us if he has recurrent events.\par preventive care with your office.\par \par Counseling regarding low saturated fat, salt and carbohydrate intake was reviewed. Active lifestyle and regular. Exercise along with weight management is advised.\par \par All the above were at length reviewed. Answered all the questions. Thank you very much for this kind referral. Please do not hesitate to give me a call for any question.\par Part of this transcription was done with voice recognition software and phonetically similar errors are common. I apologize for that. Please do not hesitate to call for any questions due to above.\par \par \par

## 2021-09-07 NOTE — ASSESSMENT
[FreeTextEntry1] : past tests for reference\par Echocardiogram which was done in January 9, 2017 at [facility] showed an ejection fraction 47%. Mildly reduced global LV dysfunction. Aortic root of 3.7 cm. Ascending aorta 3.48 cm. Septal motion abnormality related to conduction system abnormality. aortic valve sclerosis, trace aortic insufficnecy\par \par Pharmacological stress myocardial perfusion scan, 4/11/ 2017. LV ejection fraction 54% with paradoxical septal motion. Small area of mild defect in septal wall, which was fixed via probably related to left bundle-branch block. Overall, no significant worsening compared to last perfusion scan from 2007. Coronary angiogram at that time had shown no significant coronary artery disease\par \par Reviewed on August 1, 2018.\par MUGA study June 20, 2018  and ejection fraction 49%. Septal hypokinesis.\par \par Reviewed on February 19, 2019\par Echocardiogram February 11, 2019 LV ejection fraction around 45-50%  LVIDd 5.1\par \par Reviewed on September 4, 2019\par Labs from August 17, 2019 was reviewed. Sodium was 140, potassium 4.4, creatinine 1.25 ., normal.\par \par Review December 30, 2019\par Labs from November 20 6019 and December 20, 2019 was reviewed.\par MUGA 9/11/19 LVEF 50%\par \par Reviewed on November 11, 2020.\par Labs from October 6, 2020 platelet count 144 otherwise stable CBC creatinine 1.41\par Prior test from June 16 platelet count 128 otherwise stable CBC CMP with creatinine 1.34 potassium 4.5 sodium 140 LFT normal HDL 34 LDL 82 total cholesterol 140\par EKG normal sinus rhythm left bundle branch block\par Echocardiogram as reported above\par \par Reviewed on July 13, 2021.\par Most recent labs May 4, 2021 sodium 144 potassium 4.2 creatinine 1.09.

## 2021-10-28 ENCOUNTER — NON-APPOINTMENT (OUTPATIENT)
Age: 71
End: 2021-10-28

## 2021-10-28 ENCOUNTER — APPOINTMENT (OUTPATIENT)
Dept: CARDIOLOGY | Facility: CLINIC | Age: 71
End: 2021-10-28
Payer: MEDICARE

## 2021-10-28 VITALS
DIASTOLIC BLOOD PRESSURE: 72 MMHG | WEIGHT: 221 LBS | OXYGEN SATURATION: 95 % | BODY MASS INDEX: 29.93 KG/M2 | SYSTOLIC BLOOD PRESSURE: 128 MMHG | HEIGHT: 72 IN | HEART RATE: 52 BPM

## 2021-10-28 PROCEDURE — 93000 ELECTROCARDIOGRAM COMPLETE: CPT

## 2021-10-28 PROCEDURE — 99215 OFFICE O/P EST HI 40 MIN: CPT

## 2021-10-28 RX ORDER — OMEPRAZOLE 20 MG/1
20 CAPSULE, DELAYED RELEASE ORAL
Qty: 180 | Refills: 0 | Status: DISCONTINUED | COMMUNITY
Start: 2021-08-29 | End: 2021-10-28

## 2021-10-28 RX ORDER — CLARITHROMYCIN 500 MG/1
500 TABLET, FILM COATED ORAL
Qty: 28 | Refills: 0 | Status: DISCONTINUED | COMMUNITY
Start: 2021-09-01 | End: 2021-10-28

## 2021-10-28 RX ORDER — AMOXICILLIN 500 MG/1
500 CAPSULE ORAL
Qty: 56 | Refills: 0 | Status: DISCONTINUED | COMMUNITY
Start: 2021-09-01 | End: 2021-10-28

## 2021-10-28 NOTE — ASSESSMENT
[FreeTextEntry1] : past tests for reference\par Echocardiogram which was done in January 9, 2017 at [facility] showed an ejection fraction 47%. Mildly reduced global LV dysfunction. Aortic root of 3.7 cm. Ascending aorta 3.48 cm. Septal motion abnormality related to conduction system abnormality. aortic valve sclerosis, trace aortic insufficnecy\par \par Pharmacological stress myocardial perfusion scan, 4/11/ 2017. LV ejection fraction 54% with paradoxical septal motion. Small area of mild defect in septal wall, which was fixed via probably related to left bundle-branch block. Overall, no significant worsening compared to last perfusion scan from 2007. Coronary angiogram at that time had shown no significant coronary artery disease\par \par Reviewed on August 1, 2018.\par MUGA study June 20, 2018  and ejection fraction 49%. Septal hypokinesis.\par \par Reviewed on February 19, 2019\par Echocardiogram February 11, 2019 LV ejection fraction around 45-50%  LVIDd 5.1\par \par Reviewed on September 4, 2019\par Labs from August 17, 2019 was reviewed. Sodium was 140, potassium 4.4, creatinine 1.25 ., normal.\par \par Review December 30, 2019\par Labs from November 20 6019 and December 20, 2019 was reviewed.\par MUGA 9/11/19 LVEF 50%\par \par Reviewed on November 11, 2020.\par Labs from October 6, 2020 platelet count 144 otherwise stable CBC creatinine 1.41\par Prior test from June 16 platelet count 128 otherwise stable CBC CMP with creatinine 1.34 potassium 4.5 sodium 140 LFT normal HDL 34 LDL 82 total cholesterol 140\par EKG normal sinus rhythm left bundle branch block\par Echocardiogram as reported above\par \par Reviewed on July 13, 2021.\par Most recent labs May 4, 2021 sodium 144 potassium 4.2 creatinine 1.09.

## 2021-10-28 NOTE — PHYSICAL EXAM
[Well Developed] : well developed [Well Nourished] : well nourished [No Acute Distress] : no acute distress [Normal Conjunctiva] : normal conjunctiva [Normal Venous Pressure] : normal venous pressure [No Carotid Bruit] : no carotid bruit [Normal S1, S2] : normal S1, S2 [No Rub] : no rub [No Gallop] : no gallop [Clear Lung Fields] : clear lung fields [Good Air Entry] : good air entry [No Respiratory Distress] : no respiratory distress  [Soft] : abdomen soft [Non Tender] : non-tender [No Masses/organomegaly] : no masses/organomegaly [Normal Bowel Sounds] : normal bowel sounds [Normal Gait] : normal gait [No Edema] : no edema [No Cyanosis] : no cyanosis [No Clubbing] : no clubbing [No Rash] : no rash [No Skin Lesions] : no skin lesions [Moves all extremities] : moves all extremities [No Focal Deficits] : no focal deficits [Normal Speech] : normal speech [Alert and Oriented] : alert and oriented [Normal memory] : normal memory [de-identified] : Midsystolic murmur.

## 2021-10-28 NOTE — REASON FOR VISIT
[Symptom and Test Evaluation] : symptom and test evaluation [Arrhythmia/ECG Abnorrmalities] : arrhythmia/ECG abnormalities [FreeTextEntry3] : Dr. trimble [FreeTextEntry1] : 71-year-old gentleman comes in for follow-up consultation. Now needs a preoperative cardiac assessment prior to abdominal surgery for liver mass and gallbladder removal. Moderate to high risk surgery. 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  Is being evaluated with possible surgical intervention.  He had endoscopy.  Noticed to have H. pylori infection.  Gastroenterologist started him on clarithromycin for last 8 days.  Because of his underlying EKG changes he came in for EKGs\par He has no significant new complaint of chest pain. No significant shortness of breath. No significant PND, orthopnea, pedal edema.\par Has been using CPAP with good night sleep and improved blood pressure control\par He is no significant visual disturbances or focal weakness,\par He has been very active.\par He has lost weight.\par He has no claudication pain.\par He has no recent hospitalization.

## 2021-10-28 NOTE — DISCUSSION/SUMMARY
[FreeTextEntry1] : 71-year-old gentleman with above medical history active medical problems as noted below \par \par #1 Considering mod-high risk surgery with history of cardiomyopathy and left bundle branch block I have recommended him to have\par Echocardiogram for LV ejection fraction\par Pharmacological myocardial perfusion scan to rule out any significant obstructive coronary artery disease. Last ischemic evaluation 2017. Last coronary angiography 2008.\par As long as above stable we will do clearance for abdominal surgery.\par #2 History of nonischemic myopathy, and presence of left bundle branch block. Last MUGA showed ejection fraction 50%.  His blood pressure and heart rate is stable. Clinically, no signs of any significant congestive heart failure.  Stable LV ejection fraction noted on echocardiogram.  If there is worsening of LV ejection fraction dimension we will have to be more aggressive with his medical management.  We may also have to talk about biventricular pacing in presence of left bundle branch block.He will continue with lisinopril/metoprolol.\par Low salt diet. low  alcohol intake.\par Regular exercise.\par weight reduction.\par #3 hyperlipidemia. Continue statin therapy. Low saturated fat, carbohydrate intake. Regular exercise program. Weight reduction.  Obtain fasting lipid panel.\par #4 essential hypertension.  Hypertensive heart disease with septal hypertrophy.  Mild LVOT gradient asymptomatic.  Renal insufficiency.  Non-smoker.  Continue blood pressure control less than 130/80.\par In presence of LVOT gradient increase fluid intake.  Continue to follow echocardiogram.  If worsening LVOT gradient we can consider other evaluation management which may include septal reduction procedure.  At present very mild asymptomatic.  With stable blood pressure and heart rate.\par #5 Sleep apnea. Continue use of CPAP.\par #6 intermittent palpitation.  No recent episode.  If recurrent he will benefit from long-term event monitoring and decide further therapy with that.  Recommended to contact us if he has recurrent events.\par preventive care with your office.\par \par Counseling regarding low saturated fat, salt and carbohydrate intake was reviewed. Active lifestyle and regular. Exercise along with weight management is advised.\par \par All the above were at length reviewed. Answered all the questions. Thank you very much for this kind referral. Please do not hesitate to give me a call for any question.\par Part of this transcription was done with voice recognition software and phonetically similar errors are common. I apologize for that. Please do not hesitate to call for any questions due to above.\par \par Clearance will be done after above tests\par

## 2021-10-28 NOTE — CARDIOLOGY SUMMARY
[___] : [unfilled] [LVEF ___%] : LVEF [unfilled]% [None] : no pulmonary hypertension [Enlarged] : enlarged LA size [Mild] : mild mitral regurgitation [de-identified] : September 7, 2021.  Sinus bradycardia.  Left bundle branch block\par October 28, 2021. Sinus bradycardia. Left bundle branch block

## 2021-11-03 ENCOUNTER — APPOINTMENT (OUTPATIENT)
Dept: CARDIOLOGY | Facility: CLINIC | Age: 71
End: 2021-11-03
Payer: MEDICARE

## 2021-11-03 PROCEDURE — 93306 TTE W/DOPPLER COMPLETE: CPT

## 2021-11-04 ENCOUNTER — APPOINTMENT (OUTPATIENT)
Dept: CARDIOLOGY | Facility: CLINIC | Age: 71
End: 2021-11-04
Payer: MEDICARE

## 2021-11-04 PROCEDURE — 93015 CV STRESS TEST SUPVJ I&R: CPT

## 2021-11-04 PROCEDURE — A9502: CPT

## 2021-11-04 PROCEDURE — 78452 HT MUSCLE IMAGE SPECT MULT: CPT

## 2021-11-05 ENCOUNTER — APPOINTMENT (OUTPATIENT)
Dept: CARDIOLOGY | Facility: CLINIC | Age: 71
End: 2021-11-05
Payer: MEDICARE

## 2021-11-05 DIAGNOSIS — G47.33 OBSTRUCTIVE SLEEP APNEA (ADULT) (PEDIATRIC): ICD-10-CM

## 2021-11-05 DIAGNOSIS — I08.0 RHEUMATIC DISORDERS OF BOTH MITRAL AND AORTIC VALVES: ICD-10-CM

## 2021-11-05 DIAGNOSIS — Z01.810 ENCOUNTER FOR PREPROCEDURAL CARDIOVASCULAR EXAMINATION: ICD-10-CM

## 2021-11-05 DIAGNOSIS — Z99.89 OBSTRUCTIVE SLEEP APNEA (ADULT) (PEDIATRIC): ICD-10-CM

## 2021-11-05 PROCEDURE — 99442: CPT | Mod: 95

## 2021-11-05 NOTE — CARDIOLOGY SUMMARY
[___] : [unfilled] [LVEF ___%] : LVEF [unfilled]% [None] : no pulmonary hypertension [Enlarged] : enlarged LA size [Mild] : mild mitral regurgitation [de-identified] : September 7, 2021.  Sinus bradycardia.  Left bundle branch block\par October 28, 2021. Sinus bradycardia. Left bundle branch block [de-identified] : November 4, 2021.  Pharmacological myocardial perfusion scan.  No significant ischemia.  Infarction/scarring noted.  LV EF 43%. [de-identified] : November 3, 2021.  EF 50%.  Mild mitral regurgitation.  Mild to moderate aortic regurgitation.  Mild LVOT gradient.  Aortic root 4

## 2021-11-05 NOTE — DISCUSSION/SUMMARY
[FreeTextEntry1] : 71-year-old gentleman with above medical history active medical problems as noted below \par \par #1  Based on cardiovascular testing\par At present, there are no active cardiac conditions.\par No recent unstable coronary syndrome, decompensated heart failure, severe valvular heart disease or significant dysrhythmias.\par The clinical benefit of the proposed procedure outweighs the associated cardiovascular risk.\par Risk not attenuated with further cardiovascular testing.\par Prior testing as outlined above.\par Optimized from a cardiovascular perspective.\par Control blood pressure, heart rate, pulse oximetry perioperatively.\par If required appropriate intravenous medication for the outpatient oral medication for blood pressure, heart rate control.\par DVT prophylaxis as per indication.\par Aspirin if held prior to surgery restart when it is safe from surgical point of view.\par #2 History of nonischemic myopathy, and presence of left bundle branch block. Last MUGA showed ejection fraction 50%.  His blood pressure and heart rate is stable. Clinically, no signs of any significant congestive heart failure.  Stable LV ejection fraction noted on echocardiogram.  If there is worsening of LV ejection fraction dimension we will have to be more aggressive with his medical management.  We may also have to talk about biventricular pacing in presence of left bundle branch block.He will continue with lisinopril/metoprolol.\par Low salt diet. low  alcohol intake.\par Regular exercise.\par weight reduction.\par \par In presence of history of LVOT gradient avoid intravascular volume depletion.  For hypotension consider using peripheral vasoconstrictor.  Avoid tachycardia.\par \par #3 hyperlipidemia. Continue statin therapy. Low saturated fat, carbohydrate intake. Regular exercise program. Weight reduction.  Obtain fasting lipid panel.\par #4 essential hypertension.  Hypertensive heart disease with septal hypertrophy.  Mild LVOT gradient asymptomatic.  Renal insufficiency.  Non-smoker.  Continue blood pressure control less than 130/80.\par In presence of LVOT gradient increase fluid intake.  Stable.\par #5 Sleep apnea. Continue use of CPAP.\par #6 intermittent palpitation.  No recent episode.  If recurrent he will benefit from long-term event monitoring and decide further therapy with that.  Recommended to contact us if he has recurrent events.\par preventive care with your office.\par \par Counseling regarding low saturated fat, salt and carbohydrate intake was reviewed. Active lifestyle and regular. Exercise along with weight management is advised.\par \par All the above were at length reviewed. Answered all the questions. Thank you very much for this kind referral. Please do not hesitate to give me a call for any question.\par Part of this transcription was done with voice recognition software and phonetically similar errors are common. I apologize for that. Please do not hesitate to call for any questions due to above.\par \par Call for any questions\par

## 2021-11-05 NOTE — REASON FOR VISIT
[Symptom and Test Evaluation] : symptom and test evaluation [Arrhythmia/ECG Abnorrmalities] : arrhythmia/ECG abnormalities [Structural Heart and Valve Disease] : structural heart and valve disease [Hyperlipidemia] : hyperlipidemia [Hypertension] : hypertension [Other: ____] : [unfilled] [FreeTextEntry1] : Consent: Patient consented to telephone visit.\par Time: A total of 15 minutes was spent in review of pertinent medical records, discussion with the patient, evaluation of patient problem, and coordination of a care plan as part of this telephone visit.\par \par No physical examination was performed at this visit for performed virtually with exceptions as noted below.\par Physical examination was not performed as a  the risk of COVID-19 cross-contamination to be greater than the benefit to the patient conferred by the physical examination.\par \par 71-year-old was seen on telephone visit today as virtual visit could not be established.\par He was at home I am in [place] office\par No new changes in his symptoms since last seen on October 28, 2021.

## 2021-11-05 NOTE — ASSESSMENT
[FreeTextEntry1] : past tests for reference\par Echocardiogram which was done in January 9, 2017 at  showed an ejection fraction 47%. Mildly reduced global LV dysfunction. Aortic root of 3.7 cm. Ascending aorta 3.48 cm. Septal motion abnormality related to conduction system abnormality. aortic valve sclerosis, trace aortic insufficnecy\par \par Pharmacological stress myocardial perfusion scan, 4/11/ 2017. LV ejection fraction 54% with paradoxical septal motion. Small area of mild defect in septal wall, which was fixed via probably related to left bundle-branch block. Overall, no significant worsening compared to last perfusion scan from 2007. Coronary angiogram at that time had shown no significant coronary artery disease\par \par Reviewed on August 1, 2018.\par MUGA study June 20, 2018  and ejection fraction 49%. Septal hypokinesis.\par \par Reviewed on February 19, 2019\par Echocardiogram February 11, 2019 LV ejection fraction around 45-50%  LVIDd 5.1\par \par Reviewed on September 4, 2019\par Labs from August 17, 2019 was reviewed. Sodium was 140, potassium 4.4, creatinine 1.25 ., normal.\par \par Review December 30, 2019\par Labs from November 20 6019 and December 20, 2019 was reviewed.\par MUGA 9/11/19 LVEF 50%\par \par Reviewed on November 11, 2020.\par Labs from October 6, 2020 platelet count 144 otherwise stable CBC creatinine 1.41\par Prior test from June 16 platelet count 128 otherwise stable CBC CMP with creatinine 1.34 potassium 4.5 sodium 140 LFT normal HDL 34 LDL 82 total cholesterol 140\par EKG normal sinus rhythm left bundle branch block\par Echocardiogram as reported above\par \par Reviewed on July 13, 2021.\par Most recent labs May 4, 2021 sodium 144 potassium 4.2 creatinine 1.09.

## 2022-01-05 ENCOUNTER — APPOINTMENT (OUTPATIENT)
Dept: CARDIOLOGY | Facility: CLINIC | Age: 72
End: 2022-01-05

## 2022-05-10 ENCOUNTER — APPOINTMENT (OUTPATIENT)
Dept: CARDIOLOGY | Facility: CLINIC | Age: 72
End: 2022-05-10
Payer: MEDICARE

## 2022-05-10 VITALS
HEART RATE: 54 BPM | OXYGEN SATURATION: 99 % | BODY MASS INDEX: 29.8 KG/M2 | SYSTOLIC BLOOD PRESSURE: 120 MMHG | DIASTOLIC BLOOD PRESSURE: 72 MMHG | HEIGHT: 72 IN | WEIGHT: 220 LBS

## 2022-05-10 DIAGNOSIS — R04.0 EPISTAXIS: ICD-10-CM

## 2022-05-10 PROCEDURE — 99214 OFFICE O/P EST MOD 30 MIN: CPT

## 2022-05-10 RX ORDER — COLCHICINE 0.6 MG/1
0.6 TABLET ORAL
Refills: 0 | Status: ACTIVE | COMMUNITY

## 2022-05-10 RX ORDER — LISINOPRIL 10 MG/1
10 TABLET ORAL DAILY
Qty: 90 | Refills: 3 | Status: DISCONTINUED | COMMUNITY
Start: 2019-09-16 | End: 2022-05-10

## 2022-05-10 RX ORDER — LANSOPRAZOLE 30 MG/1
30 CAPSULE, DELAYED RELEASE ORAL
Qty: 28 | Refills: 0 | Status: DISCONTINUED | COMMUNITY
Start: 2021-09-01 | End: 2022-05-10

## 2022-05-10 RX ORDER — ASPIRIN 81 MG
81 TABLET, DELAYED RELEASE (ENTERIC COATED) ORAL DAILY
Refills: 0 | Status: DISCONTINUED | COMMUNITY
End: 2022-05-10

## 2022-05-10 NOTE — DISCUSSION/SUMMARY
[FreeTextEntry1] : 72-year-old gentleman with above medical history active medical problems as noted below \par \par #1  Multiple epistaxis.  Stop aspirin.  Risk benefits alternatives reviewed.  If recurrent further recommended to see ENT specialist or emergency room visit.\par #2 History of nonischemic myopathy, and presence of left bundle branch block. Last MUGA showed ejection fraction 50%.  His blood pressure and heart rate is stable. Clinically, no signs of any significant congestive heart failure.  Stable LV ejection fraction noted on echocardiogram.  If there is worsening of LV ejection fraction dimension we will have to be more aggressive with his medical management.  We may also have to talk about biventricular pacing in presence of left bundle branch block.He will continue with lisinopril/metoprolol.\par Low salt diet. low  alcohol intake.\par Regular exercise.\par weight reduction.\par Repeat echocardiogram ordered prior to next office visit.\par \par #3 hyperlipidemia. Continue statin therapy. Low saturated fat, carbohydrate intake. Regular exercise program. Weight reduction.  Obtain fasting lipid panel.\par #4 essential hypertension.  Hypertensive heart disease with septal hypertrophy.  Mild LVOT gradient asymptomatic.  Renal insufficiency.  Non-smoker.  Continue blood pressure control less than 130/80.\par In presence of LVOT gradient increase fluid intake.  Stable.  On metoprolol.  Off ACE inhibitor.\par #5 Sleep apnea. Continue use of CPAP.\par #6 intermittent palpitation.  No recent episode.  If recurrent he will benefit from long-term event monitoring and decide further therapy with that.  Recommended to contact us if he has recurrent events.\par preventive care with your office.\par \par Counseling regarding low saturated fat, salt and carbohydrate intake was reviewed. Active lifestyle and regular. Exercise along with weight management is advised.\par \par All the above were at length reviewed. Answered all the questions. Thank you very much for this kind referral. Please do not hesitate to give me a call for any question.\par Part of this transcription was done with voice recognition software and phonetically similar errors are common. I apologize for that. Please do not hesitate to call for any questions due to above.\par \par Sincerely,\par Jim Galvez MD,FACC,[place]\par \par

## 2022-05-10 NOTE — CARDIOLOGY SUMMARY
[de-identified] : September 7, 2021.  Sinus bradycardia.  Left bundle branch block\par October 28, 2021. Sinus bradycardia. Left bundle branch block [de-identified] : November 4, 2021.  Pharmacological myocardial perfusion scan.  No significant ischemia.  Infarction/scarring noted.  LV EF 43%. [de-identified] : November 3, 2021.  EF 50%.  Mild mitral regurgitation.  Mild to moderate aortic regurgitation.  Mild LVOT gradient.  Aortic root 4 [___] : [unfilled] [LVEF ___%] : LVEF [unfilled]% [None] : no pulmonary hypertension [Enlarged] : enlarged LA size [Mild] : mild mitral regurgitation

## 2022-05-10 NOTE — PHYSICAL EXAM
[Well Developed] : well developed [Well Nourished] : well nourished [No Acute Distress] : no acute distress [Obese] : obese [Normal Venous Pressure] : normal venous pressure [No Carotid Bruit] : no carotid bruit [Normal S1, S2] : normal S1, S2 [No Murmur] : no murmur [No Rub] : no rub [No Gallop] : no gallop [Clear Lung Fields] : clear lung fields [Good Air Entry] : good air entry [No Respiratory Distress] : no respiratory distress  [Normal Gait] : normal gait [No Edema] : no edema [No Cyanosis] : no cyanosis [No Clubbing] : no clubbing [No Varicosities] : no varicosities [Moves all extremities] : moves all extremities [Normal Speech] : normal speech [Alert and Oriented] : alert and oriented

## 2022-05-10 NOTE — ASSESSMENT
[FreeTextEntry1] : past tests for reference\par Echocardiogram which was done in January 9, 2017 at [facility] showed an ejection fraction 47%. Mildly reduced global LV dysfunction. Aortic root of 3.7 cm. Ascending aorta 3.48 cm. Septal motion abnormality related to conduction system abnormality. aortic valve sclerosis, trace aortic insufficnecy\par \par Pharmacological stress myocardial perfusion scan, 4/11/ 2017. LV ejection fraction 54% with paradoxical septal motion. Small area of mild defect in septal wall, which was fixed via probably related to left bundle-branch block. Overall, no significant worsening compared to last perfusion scan from 2007. Coronary angiogram at that time had shown no significant coronary artery disease\par \par Reviewed on August 1, 2018.\par MUGA study June 20, 2018  and ejection fraction 49%. Septal hypokinesis.\par \par Reviewed on February 19, 2019\par Echocardiogram February 11, 2019 LV ejection fraction around 45-50%  LVIDd 5.1\par \par Reviewed on September 4, 2019\par Labs from August 17, 2019 was reviewed. Sodium was 140, potassium 4.4, creatinine 1.25 ., normal.\par \par Review December 30, 2019\par Labs from November 20 6019 and December 20, 2019 was reviewed.\par MUGA 9/11/19 LVEF 50%\par \par Reviewed on November 11, 2020.\par Labs from October 6, 2020 platelet count 144 otherwise stable CBC creatinine 1.41\par Prior test from June 16 platelet count 128 otherwise stable CBC CMP with creatinine 1.34 potassium 4.5 sodium 140 LFT normal HDL 34 LDL 82 total cholesterol 140\par EKG normal sinus rhythm left bundle branch block\par Echocardiogram as reported above\par \par Reviewed on July 13, 2021.\par Most recent labs May 4, 2021 sodium 144 potassium 4.2\par \par Reviewed on May 10, 2022.\par Labs from April 25, 2022 sodium 140 potassium 4.1 creatinine 1.29 LFT normal hemoglobin 13.2 platelet count 123

## 2022-05-10 NOTE — REASON FOR VISIT
[Symptom and Test Evaluation] : symptom and test evaluation [Arrhythmia/ECG Abnorrmalities] : arrhythmia/ECG abnormalities [Structural Heart and Valve Disease] : structural heart and valve disease [Hyperlipidemia] : hyperlipidemia [Hypertension] : hypertension [Other: ____] : [unfilled] [FreeTextEntry1] : 72-year-old gentleman is seen in the office for follow-up consultation.  Still going through chemotherapy and radiation.\par He had multiple episodes of epistaxis recently.\par He has no unusual chest pain shortness of breath PND orthopnea palpitation lightheaded dizziness\par His activity level is stable.\par He is ACE inhibitor was discontinued because of low blood pressure recently.  Which was associated with dizziness on tamsulosin and chemotherapy agent.\par Has been tolerating his metoprolol well.

## 2022-06-23 NOTE — REVIEW OF SYSTEMS
[Negative] : Heme/Lymph Spiral Flap Text: The defect edges were debeveled with a #15 scalpel blade.  Given the location of the defect, shape of the defect and the proximity to free margins a spiral flap was deemed most appropriate.  Using a sterile surgical marker, an appropriate rotation flap was drawn incorporating the defect and placing the expected incisions within the relaxed skin tension lines where possible. The area thus outlined was incised deep to adipose tissue with a #15 scalpel blade.  The skin margins were undermined to an appropriate distance in all directions utilizing iris scissors.

## 2022-11-09 ENCOUNTER — APPOINTMENT (OUTPATIENT)
Dept: CARDIOLOGY | Facility: CLINIC | Age: 72
End: 2022-11-09

## 2022-11-09 ENCOUNTER — NON-APPOINTMENT (OUTPATIENT)
Age: 72
End: 2022-11-09

## 2022-11-09 VITALS
WEIGHT: 228 LBS | SYSTOLIC BLOOD PRESSURE: 124 MMHG | DIASTOLIC BLOOD PRESSURE: 60 MMHG | HEIGHT: 72 IN | OXYGEN SATURATION: 97 % | HEART RATE: 59 BPM | BODY MASS INDEX: 30.88 KG/M2

## 2022-11-09 PROCEDURE — 93000 ELECTROCARDIOGRAM COMPLETE: CPT

## 2022-11-09 PROCEDURE — 99214 OFFICE O/P EST MOD 30 MIN: CPT

## 2022-11-09 PROCEDURE — 93306 TTE W/DOPPLER COMPLETE: CPT

## 2022-11-09 RX ORDER — ASPIRIN 81 MG
81 TABLET, DELAYED RELEASE (ENTERIC COATED) ORAL
Refills: 0 | Status: ACTIVE | COMMUNITY

## 2022-11-09 RX ORDER — CAPECITABINE 500 MG/1
TABLET, FILM COATED ORAL
Refills: 0 | Status: DISCONTINUED | COMMUNITY
End: 2022-11-09

## 2022-11-09 NOTE — DISCUSSION/SUMMARY
[FreeTextEntry1] : 72-year-old gentleman with above medical history active medical problems as noted below \par \par #1  Labs echocardiogram EKG reviewed\par #2 History of nonischemic myopathy, and presence of left bundle branch block. Last MUGA showed ejection fraction 50%.  Echo today showed EF 50 to 55% his blood pressure and heart rate is stable. Clinically, no signs of any significant congestive heart failure.  Stable LV ejection fraction noted on echocardiogram.  If there is worsening of LV ejection fraction dimension we will have to be more aggressive with his medical management.  We may also have to talk about biventricular pacing in presence of left bundle branch block.He will continue with lisinopril/metoprolol.\par Low salt diet. low  alcohol intake.\par Regular exercise.\par weight reduction.\par Continue metoprolol.\par If blood pressure remains stable as he is off chemotherapy agents we can restart his ACE inhibitor.\par \par #3 hyperlipidemia.  Off statin therapy in relation to chemotherapy and liver neoplasm.  Repeat fasting lipid panel.  Consider statin therapy again as long as stable from hepatic point of view. Low saturated fat, carbohydrate intake. Regular exercise program. Weight reduction. \par #4 essential hypertension.  Hypertensive heart disease with septal hypertrophy.  Mild LVOT gradient asymptomatic.  Renal insufficiency.  Non-smoker.  Continue blood pressure control less than 130/80.\par In presence of LVOT gradient increase fluid intake.  Stable.  On metoprolol.  Once blood pressure stable ACE inhibitor can be started in presence of history of nonischemic cardiomyopathy.\par #5 Sleep apnea.  Encouraged him to use CPAP regularly he has been using intermittent\par #6 intermittent palpitation.  No recent episode.  If recurrent he will benefit from long-term event monitoring and decide further therapy with that.  Recommended to contact us if he has recurrent events.\par preventive care with your office.\par \par Counseling regarding low saturated fat, salt and carbohydrate intake was reviewed. Active lifestyle and regular. Exercise along with weight management is advised.\par \par All the above were at length reviewed. Answered all the questions. Thank you very much for this kind referral. Please do not hesitate to give me a call for any question.\par Part of this transcription was done with voice recognition software and phonetically similar errors are common. I apologize for that. Please do not hesitate to call for any questions due to above.\par \par Sincerely,\par Jim Galvez MD,FACC,[place]\par \par

## 2022-11-09 NOTE — REASON FOR VISIT
[Symptom and Test Evaluation] : symptom and test evaluation [Arrhythmia/ECG Abnorrmalities] : arrhythmia/ECG abnormalities [Structural Heart and Valve Disease] : structural heart and valve disease [Hyperlipidemia] : hyperlipidemia [Hypertension] : hypertension [Other: ____] : [unfilled] [FreeTextEntry1] : 72-year-old gentleman is seen in the office for follow-up consultation.  Off now chemotherapy radiation\par No further epistaxis\par He has no unusual chest pain shortness of breath PND orthopnea palpitation lightheaded dizziness\par His activity level is stable.\par He is ACE inhibitor was discontinued because of low blood pressure recently.  Which was associated with dizziness on tamsulosin and chemotherapy agent.\par Good functional status.\par Has been tolerating his metoprolol well.

## 2022-11-09 NOTE — CARDIOLOGY SUMMARY
[___] : [unfilled] [LVEF ___%] : LVEF [unfilled]% [None] : no pulmonary hypertension [Enlarged] : enlarged LA size [Mild] : mild mitral regurgitation [de-identified] : September 7, 2021.  Sinus bradycardia.  Left bundle branch block\par October 28, 2021. Sinus bradycardia. Left bundle branch block\par November 9, 2022 sinus bradycardia left bundle branch block anterolateral ST-T changes.  Unchanged [de-identified] : November 4, 2021.  Pharmacological myocardial perfusion scan.  No significant ischemia.  Infarction/scarring noted.  LV EF 43%. [de-identified] : November 3, 2021.  EF 50%.  Mild mitral regurgitation.  Mild to moderate aortic regurgitation.  Mild LVOT gradient.  Aortic root 4\par November 9, 2022 EF 50 to 55% mild mitral regurgitation mild to moderate aortic regurgitation

## 2022-11-09 NOTE — ASSESSMENT
[FreeTextEntry1] : past tests for reference\par Echocardiogram which was done in January 9, 2017 at [facility] showed an ejection fraction 47%. Mildly reduced global LV dysfunction. Aortic root of 3.7 cm. Ascending aorta 3.48 cm. Septal motion abnormality related to conduction system abnormality. aortic valve sclerosis, trace aortic insufficnecy\par \par Pharmacological stress myocardial perfusion scan, 4/11/ 2017. LV ejection fraction 54% with paradoxical septal motion. Small area of mild defect in septal wall, which was fixed via probably related to left bundle-branch block. Overall, no significant worsening compared to last perfusion scan from 2007. Coronary angiogram at that time had shown no significant coronary artery disease\par \par Reviewed on August 1, 2018.\par MUGA study June 20, 2018  and ejection fraction 49%. Septal hypokinesis.\par \par Reviewed on February 19, 2019\par Echocardiogram February 11, 2019 LV ejection fraction around 45-50%  LVIDd 5.1\par \par Reviewed on September 4, 2019\par Labs from August 17, 2019 was reviewed. Sodium was 140, potassium 4.4, creatinine 1.25 ., normal.\par \par Review December 30, 2019\par Labs from November 20 6019 and December 20, 2019 was reviewed.\par MUGA 9/11/19 LVEF 50%\par \par Reviewed on November 11, 2020.\par Labs from October 6, 2020 platelet count 144 otherwise stable CBC creatinine 1.41\par Prior test from June 16 platelet count 128 otherwise stable CBC CMP with creatinine 1.34 potassium 4.5 sodium 140 LFT normal HDL 34 LDL 82 total cholesterol 140\par EKG normal sinus rhythm left bundle branch block\par Echocardiogram as reported above\par \par Reviewed on July 13, 2021.\par Most recent labs May 4, 2021 sodium 144 potassium 4.2\par \par Reviewed on May 10, 2022.\par Labs from April 25, 2022 sodium 140 potassium 4.1 creatinine 1.29 LFT normal hemoglobin 13.2 platelet count 123\par \par November 9, 2022\par EKG, echocardiogram which was done today and labs were reviewed.

## 2022-11-09 NOTE — HISTORY OF PRESENT ILLNESS
[FreeTextEntry1] : History of \par essential hypertension. Much better controlled. The only on metoprolol. No history of CHF. No history of CKD. His nonsmoker.\par Hyperlipidemia. Not on any statin therapy. Diet controlled.\par Left bundle branch block. Chronic. \par coronary angiogram 2008: mild irregularity\par Nonischemic cardiomyopathy. Mildly reduced systolic function. September 2019 MUGA 50%. echo 2/19 45-50% LVIDd 5.1 cm\par mild- moderate aortic and mitral regurgitation. \par Mild carotid atherosclerosis \par Neoplasm of liver s/p surgery radiation chemotherapy\par

## 2023-03-06 ENCOUNTER — APPOINTMENT (OUTPATIENT)
Dept: CARDIOLOGY | Facility: CLINIC | Age: 73
End: 2023-03-06
Payer: MEDICARE

## 2023-03-06 VITALS
HEIGHT: 72 IN | WEIGHT: 228 LBS | OXYGEN SATURATION: 97 % | DIASTOLIC BLOOD PRESSURE: 72 MMHG | HEART RATE: 58 BPM | SYSTOLIC BLOOD PRESSURE: 120 MMHG | BODY MASS INDEX: 30.88 KG/M2

## 2023-03-06 DIAGNOSIS — I10 ESSENTIAL (PRIMARY) HYPERTENSION: ICD-10-CM

## 2023-03-06 PROCEDURE — 99214 OFFICE O/P EST MOD 30 MIN: CPT

## 2023-03-06 NOTE — DISCUSSION/SUMMARY
[FreeTextEntry1] : KENROY TRISTAN  is a 73 year M  who presents today Mar 06, 2023 with the above history and the following active issues.\par \par History of nonischemic myopathy, and presence of left bundle branch block. Last MUGA showed ejection fraction 50%.  Echo today showed EF 50 to 55% his blood pressure and heart rate is stable. Clinically, no signs of any significant congestive heart failure.  Stable LV ejection fraction noted on echocardiogram.  If there is worsening of LV ejection fraction dimension we will have to be more aggressive with his medical management.  We may also have to talk about biventricular pacing in presence of left bundle branch block.He will continue with lisinopril/metoprolol.\par Low salt diet. low  alcohol intake.\par Regular exercise.\par weight reduction.\par Continue metoprolol.\par \par Hyperlipidemia.  Off statin therapy in relation to chemotherapy and liver neoplasm.  Consider statin therapy again as long as stable from hepatic point of view. Low saturated fat, carbohydrate intake. Regular exercise program. Weight reduction. \par \par Essential hypertension.  Hypertensive heart disease with septal hypertrophy.  Mild LVOT gradient asymptomatic.  Renal insufficiency.  Non-smoker.  Continue blood pressure control less than 130/80.\par In presence of LVOT gradient increase fluid intake.  On metoprolol.  Once blood pressure stable ACE inhibitor can be started in presence of history of nonischemic cardiomyopathy.\par \par Sleep apnea.  Encouraged him to use CPAP regularly he has been using intermittent\par \par Atypical chest pain with recent visit to [facility] and no recurrence. \par Reviewed role of updating stress test and patient declines. \par Reviewed role of CTA and patient declines. \par Limitations of non-invasive testing reviewed.\par \par Red flag symptoms which would warrant sooner emergent evaluation reviewed with the patient. \par Questions and concerns were addressed and answered. \par \par Sincerely,\par \par Bhavani Meyer PA-C\par Patients history, testing and plan reviewed with supervising MD: Dr. Patrick Valentino

## 2023-03-06 NOTE — HISTORY OF PRESENT ILLNESS
[FreeTextEntry1] : KENROY TRISTAN  is a 73 year M  who presents today Mar 06, 2023 in clinical follow-up from recent stay at [facility]. Presented to ER with intermittent left sided chest pain. Pain described as a "prickling sensation". Symptoms occurred after lifting weights at the gym. Improved with Lidocaine patch while hospitalized. There has been no recurrence. \par [facility] records have been reviewed. \par \par \par History of \par Essential hypertension. Well controlled on my assessment. \par On metoprolol. \par \par Hyperlipidemia. Not on any statin therapy. Diet controlled.\par \par Left bundle branch block. Chronic. \par \par Coronary angiogram 2008: mild irregularity\par \par Nonischemic cardiomyopathy. Mildly reduced systolic function. September 2019 MUGA 50%. echo 2/19 45-50% LVIDd 5.1 cm\par \par Mild- moderate aortic and mitral regurgitation. \par \par Mild carotid atherosclerosis \par \par Neoplasm of liver s/p surgery radiation chemotherapy\par

## 2023-03-06 NOTE — CARDIOLOGY SUMMARY
[___] : [unfilled] [LVEF ___%] : LVEF [unfilled]% [None] : no pulmonary hypertension [Enlarged] : enlarged LA size [Mild] : mild mitral regurgitation [de-identified] : September 7, 2021.  Sinus bradycardia.  Left bundle branch block\par October 28, 2021. Sinus bradycardia. Left bundle branch block\par November 9, 2022 sinus bradycardia left bundle branch block anterolateral ST-T changes.  Unchanged [de-identified] : November 4, 2021.  Pharmacological myocardial perfusion scan.  No significant ischemia.  Infarction/scarring noted.  LV EF 43%. [de-identified] : November 3, 2021.  EF 50%.  Mild mitral regurgitation.  Mild to moderate aortic regurgitation.  Mild LVOT gradient.  Aortic root 4\par November 9, 2022 EF 50 to 55% mild mitral regurgitation mild to moderate aortic regurgitation

## 2023-05-10 ENCOUNTER — APPOINTMENT (OUTPATIENT)
Dept: CARDIOLOGY | Facility: CLINIC | Age: 73
End: 2023-05-10
Payer: MEDICARE

## 2023-05-10 VITALS
DIASTOLIC BLOOD PRESSURE: 82 MMHG | HEART RATE: 61 BPM | BODY MASS INDEX: 30.48 KG/M2 | WEIGHT: 225 LBS | SYSTOLIC BLOOD PRESSURE: 138 MMHG | OXYGEN SATURATION: 97 % | HEIGHT: 72 IN

## 2023-05-10 DIAGNOSIS — Z79.899 OTHER LONG TERM (CURRENT) DRUG THERAPY: ICD-10-CM

## 2023-05-10 DIAGNOSIS — Q24.8 OTHER SPECIFIED CONGENITAL MALFORMATIONS OF HEART: ICD-10-CM

## 2023-05-10 DIAGNOSIS — I42.9 CARDIOMYOPATHY, UNSPECIFIED: ICD-10-CM

## 2023-05-10 PROCEDURE — 99214 OFFICE O/P EST MOD 30 MIN: CPT

## 2023-05-10 NOTE — HISTORY OF PRESENT ILLNESS
TALK TO PATIENT ABOUT RESULTS [FreeTextEntry1] : History of \par essential hypertension. Much better controlled. The only on metoprolol. No history of CHF. No history of CKD. His nonsmoker.\par Hyperlipidemia. Not on any statin therapy. Diet controlled.\par Left bundle branch block. Chronic. \par coronary angiogram 2008: mild irregularity\par Nonischemic cardiomyopathy. Mildly reduced systolic function. September 2019 MUGA 50%. echo 2/19 45-50% LVIDd 5.1 cm\par mild- moderate aortic and mitral regurgitation. \par Mild carotid atherosclerosis \par Neoplasm of liver s/p surgery radiation chemotherapy\par

## 2023-05-10 NOTE — PHYSICAL EXAM
[Well Developed] : well developed [Well Nourished] : well nourished [No Acute Distress] : no acute distress [Obese] : obese [Normal Venous Pressure] : normal venous pressure [No Carotid Bruit] : no carotid bruit [Normal S1, S2] : normal S1, S2 [No Murmur] : no murmur [No Rub] : no rub [No Gallop] : no gallop [Clear Lung Fields] : clear lung fields [Good Air Entry] : good air entry [No Respiratory Distress] : no respiratory distress  [Normal Gait] : normal gait [No Edema] : no edema [No Cyanosis] : no cyanosis [No Clubbing] : no clubbing [No Varicosities] : no varicosities [Moves all extremities] : moves all extremities [Normal Speech] : normal speech [Alert and Oriented] : alert and oriented [Normal Radial B/L] : normal radial B/L

## 2023-05-10 NOTE — REASON FOR VISIT
[Symptom and Test Evaluation] : symptom and test evaluation [Arrhythmia/ECG Abnorrmalities] : arrhythmia/ECG abnormalities [Structural Heart and Valve Disease] : structural heart and valve disease [Hyperlipidemia] : hyperlipidemia [Hypertension] : hypertension [Other: ____] : [unfilled] [FreeTextEntry1] : 73-year-old gentleman comes in for follow-up consultation.  Since last seen emergency room visit for noncardiac chest pain.\par Off chemotherapy.  Does have diagnosis of pulmonary nodule and prostate nodule being followed with oncologist as well as urologist.\par He has no unusual chest pain shortness of breath PND orthopnea palpitation lightheaded dizziness\par His activity level is stable.\par He has stayed off ACE inhibitor.  And atorvastatin so far because of low blood pressure as well as being on chemotherapy for hepatic neoplasm.  Now blood pressure is stable.  No further dizziness.  And he is off chemotherapy agents with stable liver function.\par Good functional status.\par Has been tolerating his metoprolol well.

## 2023-05-10 NOTE — DISCUSSION/SUMMARY
[FreeTextEntry1] : 73-year-old gentleman with above medical history active medical problems as noted below \par \par #1  Labs echocardiogram EKG reviewed\par #2 History of nonischemic myopathy, classI heart failure symptoms.  And presence of left bundle branch block. Last MUGA showed ejection fraction 50%.  Echo 2022 showed EF 50 to 55% his blood pressure and heart rate is stable. Clinically, no signs of any significant congestive heart failure.  Continue with beta-blockers.  Asymptomatic sinus bradycardia.  We will restart lisinopril 5 mg.  Maximize if tolerated in presence of his history of nonischemic cardiomyopathy now with improved LV ejection fraction.\par Continue to follow echocardiogram/LV ejection fraction dimensions.  If there is worsening of LV ejection fraction dimension we will have to be more aggressive with his medical management.  We may also have to talk about biventricular pacing in presence of left bundle branch block.He will continue with lisinopril/metoprolol.\par Low salt diet. low  alcohol intake.\par Regular exercise.\par weight reduction..\par \par #3 hyperlipidemia.  Off statin therapy in relation to chemotherapy and liver neoplasm.  Restart low-dose atorvastatin.  Follow liver function tests.. Low saturated fat, carbohydrate intake. Regular exercise program. Weight reduction. \par #4 essential hypertension.  Hypertensive heart disease with septal hypertrophy.  Mild LVOT gradient asymptomatic.  Renal insufficiency.  Non-smoker.  Continue blood pressure control less than 130/80.\par In presence of LVOT gradient increase fluid intake.  Stable.  On metoprolol.  \par #5 Sleep apnea.  Encouraged him to use CPAP regularly he has been using intermittent\par #6 intermittent palpitation.  No recent episode.  If recurrent he will benefit from long-term event monitoring and decide further therapy with that.  Recommended to contact us if he has recurrent events.\par preventive care with your office.\par \par Counseling regarding low saturated fat, salt and carbohydrate intake was reviewed. Active lifestyle and regular. Exercise along with weight management is advised.\par \par All the above were at length reviewed. Answered all the questions. Thank you very much for this kind referral. Please do not hesitate to give me a call for any question.\par Part of this transcription was done with voice recognition software and phonetically similar errors are common. I apologize for that. Please do not hesitate to call for any questions due to above.\par \par Sincerely,\par Jim Galvez MD,FACC,[place]\par \par

## 2023-05-10 NOTE — CARDIOLOGY SUMMARY
[___] : [unfilled] [LVEF ___%] : LVEF [unfilled]% [None] : no pulmonary hypertension [Enlarged] : enlarged LA size [Mild] : mild mitral regurgitation [de-identified] : September 7, 2021.  Sinus bradycardia.  Left bundle branch block\par October 28, 2021. Sinus bradycardia. Left bundle branch block\par November 9, 2022 sinus bradycardia left bundle branch block anterolateral ST-T changes.  Unchanged\par February 2023 sinus bradycardia left bundle branch block [de-identified] : November 4, 2021.  Pharmacological myocardial perfusion scan.  No significant ischemia.  Infarction/scarring noted.  LV EF 43%. [de-identified] : November 3, 2021.  EF 50%.  Mild mitral regurgitation.  Mild to moderate aortic regurgitation.  Mild LVOT gradient.  Aortic root 4\par November 9, 2022 EF 50 to 55% mild mitral regurgitation mild to moderate aortic regurgitation

## 2023-06-20 ENCOUNTER — NON-APPOINTMENT (OUTPATIENT)
Age: 73
End: 2023-06-20

## 2023-09-13 ENCOUNTER — APPOINTMENT (OUTPATIENT)
Dept: CARDIOLOGY | Facility: CLINIC | Age: 73
End: 2023-09-13

## 2023-11-08 ENCOUNTER — RX RENEWAL (OUTPATIENT)
Age: 73
End: 2023-11-08

## 2024-05-08 ENCOUNTER — RX RENEWAL (OUTPATIENT)
Age: 74
End: 2024-05-08

## 2024-05-23 NOTE — PHYSICAL EXAM
[Well Developed] : well developed [Well Nourished] : well nourished [No Acute Distress] : no acute distress [Obese] : obese [Normal Venous Pressure] : normal venous pressure [No Carotid Bruit] : no carotid bruit [Normal S1, S2] : normal S1, S2 [No Murmur] : no murmur [No Rub] : no rub [No Gallop] : no gallop [Clear Lung Fields] : clear lung fields [Good Air Entry] : good air entry [No Respiratory Distress] : no respiratory distress  [Normal Gait] : normal gait [No Edema] : no edema [No Cyanosis] : no cyanosis [No Clubbing] : no clubbing [No Varicosities] : no varicosities [Normal Radial B/L] : normal radial B/L [Moves all extremities] : moves all extremities [Normal Speech] : normal speech [Alert and Oriented] : alert and oriented

## 2024-05-28 ENCOUNTER — NON-APPOINTMENT (OUTPATIENT)
Age: 74
End: 2024-05-28

## 2024-05-28 ENCOUNTER — APPOINTMENT (OUTPATIENT)
Dept: CARDIOLOGY | Facility: CLINIC | Age: 74
End: 2024-05-28
Payer: MEDICARE

## 2024-05-28 VITALS
SYSTOLIC BLOOD PRESSURE: 128 MMHG | HEART RATE: 59 BPM | BODY MASS INDEX: 30.88 KG/M2 | WEIGHT: 228 LBS | DIASTOLIC BLOOD PRESSURE: 68 MMHG | OXYGEN SATURATION: 96 % | HEIGHT: 72 IN

## 2024-05-28 DIAGNOSIS — E78.5 HYPERLIPIDEMIA, UNSPECIFIED: ICD-10-CM

## 2024-05-28 DIAGNOSIS — I42.8 OTHER CARDIOMYOPATHIES: ICD-10-CM

## 2024-05-28 DIAGNOSIS — I44.7 LEFT BUNDLE-BRANCH BLOCK, UNSPECIFIED: ICD-10-CM

## 2024-05-28 DIAGNOSIS — I13.10 HYPERTENSIVE HEART AND CHRONIC KIDNEY DISEASE W/OUT HEART FAILURE, WITH STAGE 1 THROUGH STAGE 4 CHRONIC KIDNEY DISEASE, OR UNSPECIFIED CHRONIC KIDNEY DISEASE: ICD-10-CM

## 2024-05-28 PROCEDURE — 99214 OFFICE O/P EST MOD 30 MIN: CPT

## 2024-05-28 PROCEDURE — G2211 COMPLEX E/M VISIT ADD ON: CPT

## 2024-05-28 PROCEDURE — 93000 ELECTROCARDIOGRAM COMPLETE: CPT

## 2024-05-28 RX ORDER — METOPROLOL SUCCINATE 50 MG/1
50 TABLET, EXTENDED RELEASE ORAL
Qty: 90 | Refills: 3 | Status: ACTIVE | COMMUNITY
Start: 1900-01-01 | End: 1900-01-01

## 2024-05-28 RX ORDER — LISINOPRIL 5 MG/1
5 TABLET ORAL
Qty: 90 | Refills: 3 | Status: ACTIVE | COMMUNITY
Start: 2023-05-10 | End: 1900-01-01

## 2024-05-28 RX ORDER — ATORVASTATIN CALCIUM 20 MG/1
20 TABLET, FILM COATED ORAL
Qty: 90 | Refills: 3 | Status: ACTIVE | COMMUNITY
Start: 2023-05-10 | End: 1900-01-01

## 2024-05-28 NOTE — REASON FOR VISIT
[Symptom and Test Evaluation] : symptom and test evaluation [Arrhythmia/ECG Abnorrmalities] : arrhythmia/ECG abnormalities [Structural Heart and Valve Disease] : structural heart and valve disease [Hyperlipidemia] : hyperlipidemia [Hypertension] : hypertension [Other: ____] : [unfilled] [FreeTextEntry1] : 74-year-old gentleman comes in for clinical follow-up.  He had a recent trip to [place] for 2 weeks.  Has gained weight.  Though he did ambulate and walked a lot without any significant new symptoms.  Has been compliant with his medications. He has no unusual chest pain shortness of breath PND orthopnea palpitation lightheaded dizziness His activity level is stable. He has stayed off ACE inhibitor.  And atorvastatin so far because of low blood pressure as well as being on chemotherapy for hepatic neoplasm.  Now blood pressure is stable.  No further dizziness.  And he is off chemotherapy agents with stable liver function. Good functional status. Has been tolerating his metoprolol well.

## 2024-05-28 NOTE — DISCUSSION/SUMMARY
[FreeTextEntry1] : 74-year-old gentleman with above medical history active medical problems as noted below   #1  EKG from today reviewed. #2 History of nonischemic myopathy, classI heart failure symptoms.  And presence of left bundle branch block. Last MUGA showed ejection fraction 50%.  Echo 2022 showed EF 50 to 55% his blood pressure and heart rate is stable. Clinically, no signs of any significant congestive heart failure.  Continue with beta-blockers.  Asymptomatic sinus bradycardia. continue lisinopril 5 mg.  Metoprolol.  Maximize if tolerated in presence of his history of nonischemic cardiomyopathy now with improved LV ejection fraction. Continue to follow echocardiogram/LV ejection fraction dimensions.  If there is worsening of LV ejection fraction dimension we will have to be more aggressive with his medical management which may include use of Entresto/SGLT2 inhibitors/MRA.  We may also have to talk about biventricular pacing in presence of left bundle branch block . Low salt diet. low  alcohol intake. Regular exercise. weight reduction..  #3 hyperlipidemia.   low-dose atorvastatin.  Follow liver function tests.. Low saturated fat, carbohydrate intake. Regular exercise program. Weight reduction.  #4 essential hypertension.  Hypertensive heart disease with septal hypertrophy.  Mild LVOT gradient asymptomatic.  Renal insufficiency.  Non-smoker.  Continue blood pressure control less than 130/80. In presence of LVOT gradient increase fluid intake.  Stable.  On metoprolol.   #5 Sleep apnea.  Encouraged him to use CPAP regularly he has been using intermittent   Counseling regarding low saturated fat, salt and carbohydrate intake was reviewed. Active lifestyle and regular. Exercise along with weight management is advised.  All the above were at length reviewed. Answered all the questions. Thank you very much for this kind referral. Please do not hesitate to give me a call for any question. Part of this transcription was done with voice recognition software and phonetically similar errors are common. I apologize for that. Please do not hesitate to call for any questions due to above.  Sincerely, Jim Galvez MD,[facility],[place]   [EKG obtained to assist in diagnosis and management of assessed problem(s)] : EKG obtained to assist in diagnosis and management of assessed problem(s)

## 2024-05-28 NOTE — REVIEW OF SYSTEMS
[Negative] : Heme/Lymph [Weight Gain (___ Lbs)] : [unfilled] ~Ulb weight gain [Dizziness] : no dizziness [FreeTextEntry5] : As per HPI

## 2024-05-28 NOTE — CARDIOLOGY SUMMARY
[___] : [unfilled] [LVEF ___%] : LVEF [unfilled]% [None] : no pulmonary hypertension [Enlarged] : enlarged LA size [Mild] : mild mitral regurgitation [de-identified] : November 4, 2021.  Pharmacological myocardial perfusion scan.  No significant ischemia.  Infarction/scarring noted.  LV EF 43%. [de-identified] : September 7, 2021.  Sinus bradycardia.  Left bundle branch block October 28, 2021. Sinus bradycardia. Left bundle branch block November 9, 2022 sinus bradycardia left bundle branch block anterolateral ST-T changes.  Unchanged   May 28, 2024.  Sinus bradycardia.  Left bundle branch block.  Anterolateral ST-T changes. February 2023 sinus bradycardia left bundle branch block [de-identified] : November 3, 2021.  EF 50%.  Mild mitral regurgitation.  Mild to moderate aortic regurgitation.  Mild LVOT gradient.  Aortic root 4\par  November 9, 2022 EF 50 to 55% mild mitral regurgitation mild to moderate aortic regurgitation

## 2024-07-10 ENCOUNTER — APPOINTMENT (OUTPATIENT)
Dept: CARDIOLOGY | Facility: CLINIC | Age: 74
End: 2024-07-10
Payer: MEDICARE

## 2024-07-10 VITALS
HEIGHT: 72 IN | HEART RATE: 68 BPM | WEIGHT: 226 LBS | SYSTOLIC BLOOD PRESSURE: 120 MMHG | OXYGEN SATURATION: 97 % | DIASTOLIC BLOOD PRESSURE: 70 MMHG | BODY MASS INDEX: 30.61 KG/M2

## 2024-07-10 DIAGNOSIS — E78.5 HYPERLIPIDEMIA, UNSPECIFIED: ICD-10-CM

## 2024-07-10 DIAGNOSIS — I13.10 HYPERTENSIVE HEART AND CHRONIC KIDNEY DISEASE W/OUT HEART FAILURE, WITH STAGE 1 THROUGH STAGE 4 CHRONIC KIDNEY DISEASE, OR UNSPECIFIED CHRONIC KIDNEY DISEASE: ICD-10-CM

## 2024-07-10 DIAGNOSIS — I44.7 LEFT BUNDLE-BRANCH BLOCK, UNSPECIFIED: ICD-10-CM

## 2024-07-10 DIAGNOSIS — I42.8 OTHER CARDIOMYOPATHIES: ICD-10-CM

## 2024-07-10 PROCEDURE — 99214 OFFICE O/P EST MOD 30 MIN: CPT

## 2024-07-10 PROCEDURE — G2211 COMPLEX E/M VISIT ADD ON: CPT

## 2024-07-10 PROCEDURE — 93306 TTE W/DOPPLER COMPLETE: CPT

## 2024-08-28 ENCOUNTER — NON-APPOINTMENT (OUTPATIENT)
Age: 74
End: 2024-08-28

## 2025-01-21 ENCOUNTER — NON-APPOINTMENT (OUTPATIENT)
Age: 75
End: 2025-01-21

## 2025-01-21 ENCOUNTER — APPOINTMENT (OUTPATIENT)
Dept: CARDIOLOGY | Facility: CLINIC | Age: 75
End: 2025-01-21
Payer: MEDICARE

## 2025-01-21 VITALS
WEIGHT: 230 LBS | DIASTOLIC BLOOD PRESSURE: 84 MMHG | BODY MASS INDEX: 31.15 KG/M2 | HEIGHT: 72 IN | OXYGEN SATURATION: 99 % | SYSTOLIC BLOOD PRESSURE: 122 MMHG | HEART RATE: 57 BPM

## 2025-01-21 DIAGNOSIS — E78.5 HYPERLIPIDEMIA, UNSPECIFIED: ICD-10-CM

## 2025-01-21 DIAGNOSIS — R09.89 OTHER SPECIFIED SYMPTOMS AND SIGNS INVOLVING THE CIRCULATORY AND RESPIRATORY SYSTEMS: ICD-10-CM

## 2025-01-21 DIAGNOSIS — Q24.8 OTHER SPECIFIED CONGENITAL MALFORMATIONS OF HEART: ICD-10-CM

## 2025-01-21 DIAGNOSIS — I42.8 OTHER CARDIOMYOPATHIES: ICD-10-CM

## 2025-01-21 DIAGNOSIS — I13.10 HYPERTENSIVE HEART AND CHRONIC KIDNEY DISEASE W/OUT HEART FAILURE, WITH STAGE 1 THROUGH STAGE 4 CHRONIC KIDNEY DISEASE, OR UNSPECIFIED CHRONIC KIDNEY DISEASE: ICD-10-CM

## 2025-01-21 DIAGNOSIS — I44.7 LEFT BUNDLE-BRANCH BLOCK, UNSPECIFIED: ICD-10-CM

## 2025-01-21 PROCEDURE — 99214 OFFICE O/P EST MOD 30 MIN: CPT

## 2025-01-21 PROCEDURE — 93000 ELECTROCARDIOGRAM COMPLETE: CPT

## 2025-01-21 PROCEDURE — G2211 COMPLEX E/M VISIT ADD ON: CPT

## 2025-07-08 ENCOUNTER — APPOINTMENT (OUTPATIENT)
Dept: CARDIOLOGY | Facility: CLINIC | Age: 75
End: 2025-07-08
Payer: MEDICARE

## 2025-07-08 VITALS
OXYGEN SATURATION: 97 % | HEART RATE: 61 BPM | WEIGHT: 231 LBS | SYSTOLIC BLOOD PRESSURE: 146 MMHG | BODY MASS INDEX: 31.29 KG/M2 | DIASTOLIC BLOOD PRESSURE: 80 MMHG | HEIGHT: 72 IN

## 2025-07-08 PROCEDURE — 93880 EXTRACRANIAL BILAT STUDY: CPT

## 2025-07-08 PROCEDURE — 93306 TTE W/DOPPLER COMPLETE: CPT

## 2025-07-08 PROCEDURE — 99214 OFFICE O/P EST MOD 30 MIN: CPT

## 2025-07-08 PROCEDURE — 76376 3D RENDER W/INTRP POSTPROCES: CPT

## 2025-07-08 PROCEDURE — G2211 COMPLEX E/M VISIT ADD ON: CPT
